# Patient Record
Sex: MALE | Race: WHITE | NOT HISPANIC OR LATINO | Employment: OTHER | URBAN - METROPOLITAN AREA
[De-identification: names, ages, dates, MRNs, and addresses within clinical notes are randomized per-mention and may not be internally consistent; named-entity substitution may affect disease eponyms.]

---

## 2017-12-14 ENCOUNTER — CONVERSION ENCOUNTER (OUTPATIENT)
Dept: SURGERY | Facility: CLINIC | Age: 52
End: 2017-12-14

## 2018-06-07 ENCOUNTER — HOSPITAL ENCOUNTER (OUTPATIENT)
Dept: CT IMAGING | Facility: HOSPITAL | Age: 53
Discharge: HOME | End: 2018-06-07
Attending: THORACIC SURGERY (CARDIOTHORACIC VASCULAR SURGERY) | Admitting: THORACIC SURGERY (CARDIOTHORACIC VASCULAR SURGERY)

## 2018-06-07 LAB — CREAT BLDA-MCNC: 0.9 MG/DL (ref 0.6–1.3)

## 2019-06-04 VITALS
HEART RATE: 77 BPM | HEIGHT: 68 IN | DIASTOLIC BLOOD PRESSURE: 101 MMHG | SYSTOLIC BLOOD PRESSURE: 156 MMHG | OXYGEN SATURATION: 96 % | BODY MASS INDEX: 37.28 KG/M2 | WEIGHT: 246 LBS

## 2022-06-13 ENCOUNTER — LAB REQUISITION (OUTPATIENT)
Dept: LAB | Facility: HOSPITAL | Age: 57
End: 2022-06-13

## 2022-06-13 DIAGNOSIS — K57.32 DIVERTICULITIS OF LARGE INTESTINE WITHOUT PERFORATION OR ABSCESS WITHOUT BLEEDING: ICD-10-CM

## 2022-06-13 DIAGNOSIS — R10.84 GENERALIZED ABDOMINAL PAIN: ICD-10-CM

## 2022-06-13 DIAGNOSIS — K57.30 DIVERTICULOSIS OF LARGE INTESTINE WITHOUT PERFORATION OR ABSCESS WITHOUT BLEEDING: ICD-10-CM

## 2022-06-13 DIAGNOSIS — K92.1 MELENA: ICD-10-CM

## 2022-06-13 PROCEDURE — 88305 TISSUE EXAM BY PATHOLOGIST: CPT | Performed by: INTERNAL MEDICINE

## 2022-06-14 LAB
LAB AP CASE REPORT: NORMAL
PATH REPORT.FINAL DX SPEC: NORMAL
PATH REPORT.GROSS SPEC: NORMAL

## 2024-11-01 ENCOUNTER — OFFICE (AMBULATORY)
Dept: URBAN - METROPOLITAN AREA CLINIC 64 | Facility: CLINIC | Age: 59
End: 2024-11-01
Payer: COMMERCIAL

## 2024-11-01 ENCOUNTER — OFFICE (AMBULATORY)
Age: 59
End: 2024-11-01

## 2024-11-01 VITALS
SYSTOLIC BLOOD PRESSURE: 139 MMHG | WEIGHT: 220 LBS | DIASTOLIC BLOOD PRESSURE: 84 MMHG | HEART RATE: 80 BPM | HEIGHT: 69 IN | HEART RATE: 80 BPM | DIASTOLIC BLOOD PRESSURE: 84 MMHG | SYSTOLIC BLOOD PRESSURE: 139 MMHG | HEIGHT: 69 IN | WEIGHT: 220 LBS

## 2024-11-01 DIAGNOSIS — K22.10 ULCER OF ESOPHAGUS WITHOUT BLEEDING: ICD-10-CM

## 2024-11-01 DIAGNOSIS — R93.3 ABNORMAL FINDINGS ON DIAGNOSTIC IMAGING OF OTHER PARTS OF DI: ICD-10-CM

## 2024-11-01 DIAGNOSIS — R49.0 DYSPHONIA: ICD-10-CM

## 2024-11-01 DIAGNOSIS — K25.9 GASTRIC ULCER, UNSPECIFIED AS ACUTE OR CHRONIC, WITHOUT HEMO: ICD-10-CM

## 2024-11-01 PROCEDURE — 99204 OFFICE O/P NEW MOD 45 MIN: CPT | Performed by: NURSE PRACTITIONER

## 2024-11-01 RX ORDER — ESOMEPRAZOLE MAGNESIUM 40 MG/1
CAPSULE, DELAYED RELEASE PELLETS ORAL
Qty: 90 | Refills: 3 | Status: ACTIVE

## 2024-11-03 ENCOUNTER — APPOINTMENT (OUTPATIENT)
Dept: CT IMAGING | Facility: HOSPITAL | Age: 59
End: 2024-11-03
Payer: COMMERCIAL

## 2024-11-03 ENCOUNTER — HOSPITAL ENCOUNTER (OUTPATIENT)
Facility: HOSPITAL | Age: 59
Setting detail: OBSERVATION
Discharge: HOME | End: 2024-11-04
Attending: EMERGENCY MEDICINE | Admitting: EMERGENCY MEDICINE
Payer: COMMERCIAL

## 2024-11-03 DIAGNOSIS — R11.0 NAUSEA WITHOUT VOMITING: ICD-10-CM

## 2024-11-03 DIAGNOSIS — R10.84 GENERALIZED ABDOMINAL PAIN: Primary | ICD-10-CM

## 2024-11-03 DIAGNOSIS — R19.7 DIARRHEA, UNSPECIFIED TYPE: ICD-10-CM

## 2024-11-03 DIAGNOSIS — A41.9 SEPSIS, DUE TO UNSPECIFIED ORGANISM, UNSPECIFIED WHETHER ACUTE ORGAN DYSFUNCTION PRESENT: ICD-10-CM

## 2024-11-03 DIAGNOSIS — K57.92 DIVERTICULITIS: ICD-10-CM

## 2024-11-03 LAB
ALBUMIN SERPL-MCNC: 4.6 G/DL (ref 3.5–5.2)
ALBUMIN/GLOB SERPL: 1.6 G/DL
ALP SERPL-CCNC: 89 U/L (ref 39–117)
ALT SERPL W P-5'-P-CCNC: 10 U/L (ref 1–41)
AMYLASE SERPL-CCNC: 64 U/L (ref 28–100)
ANION GAP SERPL CALCULATED.3IONS-SCNC: 13 MMOL/L (ref 5–15)
AST SERPL-CCNC: 21 U/L (ref 1–40)
B PARAPERT DNA SPEC QL NAA+PROBE: NOT DETECTED
B PERT DNA SPEC QL NAA+PROBE: NOT DETECTED
BASOPHILS # BLD AUTO: 0.07 10*3/MM3 (ref 0–0.2)
BASOPHILS NFR BLD AUTO: 0.5 % (ref 0–1.5)
BILIRUB SERPL-MCNC: 1.2 MG/DL (ref 0–1.2)
BILIRUB UR QL STRIP: NEGATIVE
BUN SERPL-MCNC: 9 MG/DL (ref 8–23)
BUN/CREAT SERPL: 9.8 (ref 7–25)
C PNEUM DNA NPH QL NAA+NON-PROBE: NOT DETECTED
CALCIUM SPEC-SCNC: 9.7 MG/DL (ref 8.6–10.5)
CHLORIDE SERPL-SCNC: 98 MMOL/L (ref 98–107)
CLARITY UR: CLEAR
CO2 SERPL-SCNC: 28 MMOL/L (ref 22–29)
COLOR UR: YELLOW
CREAT SERPL-MCNC: 0.92 MG/DL (ref 0.76–1.27)
D-LACTATE SERPL-SCNC: 0.8 MMOL/L (ref 0.3–2)
DEPRECATED RDW RBC AUTO: 41.1 FL (ref 37–54)
EGFRCR SERPLBLD CKD-EPI 2021: 95.2 ML/MIN/1.73
EOSINOPHIL # BLD AUTO: 0.1 10*3/MM3 (ref 0–0.4)
EOSINOPHIL NFR BLD AUTO: 0.8 % (ref 0.3–6.2)
ERYTHROCYTE [DISTWIDTH] IN BLOOD BY AUTOMATED COUNT: 12.9 % (ref 12.3–15.4)
FLUAV SUBTYP SPEC NAA+PROBE: NOT DETECTED
FLUBV RNA ISLT QL NAA+PROBE: NOT DETECTED
GLOBULIN UR ELPH-MCNC: 2.9 GM/DL
GLUCOSE SERPL-MCNC: 86 MG/DL (ref 65–99)
GLUCOSE UR STRIP-MCNC: NEGATIVE MG/DL
HADV DNA SPEC NAA+PROBE: NOT DETECTED
HCOV 229E RNA SPEC QL NAA+PROBE: NOT DETECTED
HCOV HKU1 RNA SPEC QL NAA+PROBE: NOT DETECTED
HCOV NL63 RNA SPEC QL NAA+PROBE: NOT DETECTED
HCOV OC43 RNA SPEC QL NAA+PROBE: NOT DETECTED
HCT VFR BLD AUTO: 47.2 % (ref 37.5–51)
HGB BLD-MCNC: 16.3 G/DL (ref 13–17.7)
HGB UR QL STRIP.AUTO: NEGATIVE
HMPV RNA NPH QL NAA+NON-PROBE: NOT DETECTED
HOLD SPECIMEN: NORMAL
HOLD SPECIMEN: NORMAL
HPIV1 RNA ISLT QL NAA+PROBE: NOT DETECTED
HPIV2 RNA SPEC QL NAA+PROBE: NOT DETECTED
HPIV3 RNA NPH QL NAA+PROBE: NOT DETECTED
HPIV4 P GENE NPH QL NAA+PROBE: NOT DETECTED
IMM GRANULOCYTES # BLD AUTO: 0.07 10*3/MM3 (ref 0–0.05)
IMM GRANULOCYTES NFR BLD AUTO: 0.5 % (ref 0–0.5)
KETONES UR QL STRIP: NEGATIVE
LEUKOCYTE ESTERASE UR QL STRIP.AUTO: NEGATIVE
LIPASE SERPL-CCNC: 31 U/L (ref 13–60)
LYMPHOCYTES # BLD AUTO: 1.64 10*3/MM3 (ref 0.7–3.1)
LYMPHOCYTES NFR BLD AUTO: 12.6 % (ref 19.6–45.3)
M PNEUMO IGG SER IA-ACNC: NOT DETECTED
MCH RBC QN AUTO: 30.1 PG (ref 26.6–33)
MCHC RBC AUTO-ENTMCNC: 34.5 G/DL (ref 31.5–35.7)
MCV RBC AUTO: 87.2 FL (ref 79–97)
MONOCYTES # BLD AUTO: 1.08 10*3/MM3 (ref 0.1–0.9)
MONOCYTES NFR BLD AUTO: 8.3 % (ref 5–12)
NEUTROPHILS NFR BLD AUTO: 10.02 10*3/MM3 (ref 1.7–7)
NEUTROPHILS NFR BLD AUTO: 77.3 % (ref 42.7–76)
NITRITE UR QL STRIP: NEGATIVE
NRBC BLD AUTO-RTO: 0 /100 WBC (ref 0–0.2)
PH UR STRIP.AUTO: 5.5 [PH] (ref 5–8)
PLATELET # BLD AUTO: 246 10*3/MM3 (ref 140–450)
PMV BLD AUTO: 10.9 FL (ref 6–12)
POTASSIUM SERPL-SCNC: 3.1 MMOL/L (ref 3.5–5.2)
PROCALCITONIN SERPL-MCNC: 0.08 NG/ML (ref 0–0.25)
PROT SERPL-MCNC: 7.5 G/DL (ref 6–8.5)
PROT UR QL STRIP: NEGATIVE
RBC # BLD AUTO: 5.41 10*6/MM3 (ref 4.14–5.8)
RHINOVIRUS RNA SPEC NAA+PROBE: NOT DETECTED
RSV RNA NPH QL NAA+NON-PROBE: NOT DETECTED
S PYO AG THROAT QL: NEGATIVE
SARS-COV-2 RNA NPH QL NAA+NON-PROBE: NOT DETECTED
SODIUM SERPL-SCNC: 139 MMOL/L (ref 136–145)
SP GR UR STRIP: 1.02 (ref 1–1.03)
UROBILINOGEN UR QL STRIP: NORMAL
WBC NRBC COR # BLD AUTO: 12.98 10*3/MM3 (ref 3.4–10.8)
WHOLE BLOOD HOLD COAG: NORMAL
WHOLE BLOOD HOLD SPECIMEN: NORMAL

## 2024-11-03 PROCEDURE — 0202U NFCT DS 22 TRGT SARS-COV-2: CPT

## 2024-11-03 PROCEDURE — 25010000002 LORAZEPAM PER 2 MG

## 2024-11-03 PROCEDURE — 84145 PROCALCITONIN (PCT): CPT

## 2024-11-03 PROCEDURE — G0378 HOSPITAL OBSERVATION PER HR: HCPCS

## 2024-11-03 PROCEDURE — 81003 URINALYSIS AUTO W/O SCOPE: CPT

## 2024-11-03 PROCEDURE — 96376 TX/PRO/DX INJ SAME DRUG ADON: CPT

## 2024-11-03 PROCEDURE — 80053 COMPREHEN METABOLIC PANEL: CPT

## 2024-11-03 PROCEDURE — 74177 CT ABD & PELVIS W/CONTRAST: CPT

## 2024-11-03 PROCEDURE — 83690 ASSAY OF LIPASE: CPT

## 2024-11-03 PROCEDURE — 36415 COLL VENOUS BLD VENIPUNCTURE: CPT

## 2024-11-03 PROCEDURE — 25010000002 HYDROMORPHONE 1 MG/ML SOLUTION

## 2024-11-03 PROCEDURE — 82150 ASSAY OF AMYLASE: CPT

## 2024-11-03 PROCEDURE — 96375 TX/PRO/DX INJ NEW DRUG ADDON: CPT

## 2024-11-03 PROCEDURE — 85025 COMPLETE CBC W/AUTO DIFF WBC: CPT

## 2024-11-03 PROCEDURE — 87040 BLOOD CULTURE FOR BACTERIA: CPT

## 2024-11-03 PROCEDURE — 25010000002 ONDANSETRON PER 1 MG

## 2024-11-03 PROCEDURE — 87651 STREP A DNA AMP PROBE: CPT

## 2024-11-03 PROCEDURE — 99285 EMERGENCY DEPT VISIT HI MDM: CPT

## 2024-11-03 PROCEDURE — 96374 THER/PROPH/DIAG INJ IV PUSH: CPT

## 2024-11-03 PROCEDURE — 25010000002 PROCHLORPERAZINE 10 MG/2ML SOLUTION

## 2024-11-03 PROCEDURE — 25510000001 IOPAMIDOL PER 1 ML

## 2024-11-03 PROCEDURE — 83605 ASSAY OF LACTIC ACID: CPT

## 2024-11-03 RX ORDER — POTASSIUM CHLORIDE 1500 MG/1
40 TABLET, EXTENDED RELEASE ORAL ONCE
Status: COMPLETED | OUTPATIENT
Start: 2024-11-04 | End: 2024-11-04

## 2024-11-03 RX ORDER — PROCHLORPERAZINE EDISYLATE 5 MG/ML
5 INJECTION INTRAMUSCULAR; INTRAVENOUS ONCE
Status: COMPLETED | OUTPATIENT
Start: 2024-11-03 | End: 2024-11-03

## 2024-11-03 RX ORDER — SODIUM CHLORIDE 9 MG/ML
40 INJECTION, SOLUTION INTRAVENOUS AS NEEDED
Status: DISCONTINUED | OUTPATIENT
Start: 2024-11-03 | End: 2024-11-04 | Stop reason: HOSPADM

## 2024-11-03 RX ORDER — SODIUM CHLORIDE 0.9 % (FLUSH) 0.9 %
10 SYRINGE (ML) INJECTION EVERY 12 HOURS SCHEDULED
Status: DISCONTINUED | OUTPATIENT
Start: 2024-11-04 | End: 2024-11-04 | Stop reason: HOSPADM

## 2024-11-03 RX ORDER — SODIUM CHLORIDE 0.9 % (FLUSH) 0.9 %
10 SYRINGE (ML) INJECTION AS NEEDED
Status: DISCONTINUED | OUTPATIENT
Start: 2024-11-03 | End: 2024-11-04 | Stop reason: HOSPADM

## 2024-11-03 RX ORDER — BISACODYL 10 MG
10 SUPPOSITORY, RECTAL RECTAL DAILY PRN
Status: DISCONTINUED | OUTPATIENT
Start: 2024-11-03 | End: 2024-11-04 | Stop reason: HOSPADM

## 2024-11-03 RX ORDER — POTASSIUM CHLORIDE 1500 MG/1
40 TABLET, EXTENDED RELEASE ORAL ONCE
Status: COMPLETED | OUTPATIENT
Start: 2024-11-03 | End: 2024-11-03

## 2024-11-03 RX ORDER — LORAZEPAM 2 MG/ML
0.5 INJECTION INTRAMUSCULAR ONCE
Status: COMPLETED | OUTPATIENT
Start: 2024-11-03 | End: 2024-11-03

## 2024-11-03 RX ORDER — POLYETHYLENE GLYCOL 3350 17 G/17G
17 POWDER, FOR SOLUTION ORAL DAILY PRN
Status: DISCONTINUED | OUTPATIENT
Start: 2024-11-03 | End: 2024-11-04 | Stop reason: HOSPADM

## 2024-11-03 RX ORDER — IOPAMIDOL 755 MG/ML
100 INJECTION, SOLUTION INTRAVASCULAR
Status: COMPLETED | OUTPATIENT
Start: 2024-11-03 | End: 2024-11-03

## 2024-11-03 RX ORDER — BISACODYL 5 MG/1
5 TABLET, DELAYED RELEASE ORAL DAILY PRN
Status: DISCONTINUED | OUTPATIENT
Start: 2024-11-03 | End: 2024-11-04 | Stop reason: HOSPADM

## 2024-11-03 RX ORDER — ACETAMINOPHEN 650 MG/1
650 SUPPOSITORY RECTAL ONCE
Status: COMPLETED | OUTPATIENT
Start: 2024-11-03 | End: 2024-11-03

## 2024-11-03 RX ORDER — ESOMEPRAZOLE MAGNESIUM 40 MG/1
40 CAPSULE, DELAYED RELEASE ORAL DAILY
COMMUNITY
Start: 2024-05-21

## 2024-11-03 RX ORDER — HYDROMORPHONE HYDROCHLORIDE 1 MG/ML
0.5 INJECTION, SOLUTION INTRAMUSCULAR; INTRAVENOUS; SUBCUTANEOUS EVERY 4 HOURS PRN
Status: COMPLETED | OUTPATIENT
Start: 2024-11-03 | End: 2024-11-04

## 2024-11-03 RX ORDER — IRBESARTAN AND HYDROCHLOROTHIAZIDE 150; 12.5 MG/1; MG/1
1 TABLET, FILM COATED ORAL DAILY
COMMUNITY

## 2024-11-03 RX ORDER — AMOXICILLIN 250 MG
2 CAPSULE ORAL 2 TIMES DAILY PRN
Status: DISCONTINUED | OUTPATIENT
Start: 2024-11-03 | End: 2024-11-04 | Stop reason: HOSPADM

## 2024-11-03 RX ORDER — ONDANSETRON 2 MG/ML
4 INJECTION INTRAMUSCULAR; INTRAVENOUS ONCE
Status: COMPLETED | OUTPATIENT
Start: 2024-11-03 | End: 2024-11-03

## 2024-11-03 RX ORDER — ONDANSETRON 2 MG/ML
4 INJECTION INTRAMUSCULAR; INTRAVENOUS EVERY 6 HOURS PRN
Status: DISCONTINUED | OUTPATIENT
Start: 2024-11-03 | End: 2024-11-04 | Stop reason: HOSPADM

## 2024-11-03 RX ADMIN — ACETAMINOPHEN 650 MG: 650 SUPPOSITORY RECTAL at 17:45

## 2024-11-03 RX ADMIN — POTASSIUM CHLORIDE 40 MEQ: 1500 TABLET, EXTENDED RELEASE ORAL at 22:20

## 2024-11-03 RX ADMIN — Medication 10 ML: at 23:07

## 2024-11-03 RX ADMIN — AMOXICILLIN AND CLAVULANATE POTASSIUM 1 TABLET: 875; 125 TABLET, FILM COATED ORAL at 22:20

## 2024-11-03 RX ADMIN — IOPAMIDOL 100 ML: 755 INJECTION, SOLUTION INTRAVENOUS at 19:11

## 2024-11-03 RX ADMIN — HYDROMORPHONE HYDROCHLORIDE 0.5 MG: 1 INJECTION, SOLUTION INTRAMUSCULAR; INTRAVENOUS; SUBCUTANEOUS at 17:44

## 2024-11-03 RX ADMIN — ONDANSETRON 4 MG: 2 INJECTION, SOLUTION INTRAMUSCULAR; INTRAVENOUS at 17:44

## 2024-11-03 RX ADMIN — HYDROMORPHONE HYDROCHLORIDE 0.5 MG: 1 INJECTION, SOLUTION INTRAMUSCULAR; INTRAVENOUS; SUBCUTANEOUS at 22:20

## 2024-11-03 RX ADMIN — PROCHLORPERAZINE EDISYLATE 5 MG: 5 INJECTION INTRAMUSCULAR; INTRAVENOUS at 18:02

## 2024-11-03 RX ADMIN — LORAZEPAM 0.5 MG: 2 INJECTION INTRAMUSCULAR; INTRAVENOUS at 19:00

## 2024-11-03 NOTE — ED PROVIDER NOTES
Subjective   History of Present Illness  59-year-old male with history of hiatal hernia and gastric ulcer presents the ED with complaints of generalized abdominal pain worsening in the left lower quadrant along with nausea without vomiting and diarrhea that has been ongoing for approximately a week and worse over the last 2 days.  Reports approximately 6-8 episodes of diarrhea daily.  States he developed a fever today.  Also reports a mildly sore throat however this has been ongoing and not new.  Patient states he has a small macular rash to the cheeks and top of the head whenever his potassium is low and this is started develop over the last few days.  Denies chest pain, shortness of breath, congestion, cough, recent travel or sick contacts, dizziness, vision changes, melena, hematochezia, UTI symptoms, difficulty swallowing or difficulty handling secretions.  Reports he had an EGD completed on 4/29/2024 at [facility] and this showed a nonbleeding gastric ulcer and hiatal hernia.  He does not have a follow-up until 12/26/2024 with GI    PCP: currently trying to find a new PCP  GI: [facility]        Review of Systems   Constitutional:  Positive for fever.   HENT:  Negative for congestion.    Respiratory:  Negative for cough and shortness of breath.    Cardiovascular:  Negative for chest pain.   Gastrointestinal:  Positive for abdominal pain, diarrhea and nausea. Negative for blood in stool and vomiting.   Genitourinary:  Negative for dysuria and frequency.       Past Medical History:   Diagnosis Date    Hypertension        Allergies   Allergen Reactions    Clindamycin/Lincomycin GI Intolerance       Past Surgical History:   Procedure Laterality Date    APPENDECTOMY         History reviewed. No pertinent family history.    Social History     Socioeconomic History    Marital status: Single   Tobacco Use    Smoking status: Never    Smokeless tobacco: Never   Vaping Use    Vaping status: Never Used   Substance and Sexual  "Activity    Alcohol use: Yes     Comment: Socially    Drug use: Never    Sexual activity: Defer           Objective   Physical Exam  Vitals reviewed.   HENT:      Head: Normocephalic.      Comments: Very mild macular rash noted to the cheeks and top of head.  Patient denies itching or burning sensation  Eyes:      Extraocular Movements: Extraocular movements intact.      Conjunctiva/sclera: Conjunctivae normal.   Cardiovascular:      Rate and Rhythm: Normal rate and regular rhythm.      Pulses: Normal pulses.      Heart sounds: Normal heart sounds.   Pulmonary:      Effort: Pulmonary effort is normal.      Breath sounds: Normal breath sounds.   Abdominal:      General: Bowel sounds are normal.      Palpations: Abdomen is soft.      Tenderness: There is abdominal tenderness.      Comments: Generalized tenderness to the abdomen on palpation.  Worsening to the left lower quadrant.  No peritonitis rigidity or guarding   Musculoskeletal:         General: Normal range of motion.   Skin:     General: Skin is warm and dry.      Findings: Rash present.   Neurological:      General: No focal deficit present.      Mental Status: He is alert and oriented to person, place, and time.   Psychiatric:         Mood and Affect: Mood normal.         Behavior: Behavior normal.         Procedures           ED Course  ED Course as of 11/03/24 2313   Sun Nov 03, 2024   6981 Inspect reviewed of patient, patient had clonazepam 1mg tabs filled 1/22/24 at a quant of 90 for 30 days. Patient states he has not taken this in several weeks but is requesting something for anxiety prior to CT. 0.5 mg ativan ordered to be given prior to CT [KB]   1744 Requested rectal temp [KB]   1831 Temp: 100 °F (37.8 °C) [KB]   1849 Marked ready for CT [KB]      ED Course User Index  [KB] Glenda Caruso APRN      /84 (BP Location: Left arm, Patient Position: Lying)   Pulse 80   Temp 98.6 °F (37 °C) (Oral)   Resp 18   Ht 172.7 cm (67.99\")   Wt 100 kg " "(220 lb 7.4 oz)   SpO2 97%   BMI 33.53 kg/m²   Labs Reviewed   COMPREHENSIVE METABOLIC PANEL - Abnormal; Notable for the following components:       Result Value    Potassium 3.1 (*)     All other components within normal limits    Narrative:     GFR Normal >60  Chronic Kidney Disease <60  Kidney Failure <15     CBC WITH AUTO DIFFERENTIAL - Abnormal; Notable for the following components:    WBC 12.98 (*)     Neutrophil % 77.3 (*)     Lymphocyte % 12.6 (*)     Neutrophils, Absolute 10.02 (*)     Monocytes, Absolute 1.08 (*)     Immature Grans, Absolute 0.07 (*)     All other components within normal limits   RESPIRATORY PANEL PCR W/ COVID-19 (SARS-COV-2), NP SWAB IN UTM/VTP, 2 HR TAT - Normal    Narrative:     In the setting of a positive respiratory panel with a viral infection PLUS a negative procalcitonin without other underlying concern for bacterial infection, consider observing off antibiotics or discontinuation of antibiotics and continue supportive care. If the respiratory panel is positive for atypical bacterial infection (Bordetella pertussis, Chlamydophila pneumoniae, or Mycoplasma pneumoniae), consider antibiotic de-escalation to target atypical bacterial infection.   RAPID STREP A SCREEN - Normal   LIPASE - Normal   AMYLASE - Normal   PROCALCITONIN - Normal    Narrative:     As a Marker for Sepsis (Non-Neonates):    1. <0.5 ng/mL represents a low risk of severe sepsis and/or septic shock.  2. >2 ng/mL represents a high risk of severe sepsis and/or septic shock.    As a Marker for Lower Respiratory Tract Infections that require antibiotic therapy:    PCT on Admission    Antibiotic Therapy       6-12 Hrs later    >0.5                Strongly Recommended  >0.25 - <0.5        Recommended   0.1 - 0.25          Discouraged              Remeasure/reassess PCT  <0.1                Strongly Discouraged     Remeasure/reassess PCT    As 28 day mortality risk marker: \"Change in Procalcitonin Result\" (>80% or " <=80%) if Day 0 (or Day 1) and Day 4 values are available. Refer to [email or web address]    Change in PCT <=80%  A decrease of PCT levels below or equal to 80% defines a positive change in PCT test result representing a higher risk for 28-day all-cause mortality of patients diagnosed with severe sepsis for septic shock.    Change in PCT >80%  A decrease of PCT levels of more than 80% defines a negative change in PCT result representing a lower risk for 28-day all-cause mortality of patients diagnosed with severe sepsis or septic shock.      URINALYSIS W/ CULTURE IF INDICATED - Normal    Narrative:     In absence of clinical symptoms, the presence of pyuria, bacteria, and/or nitrites on the urinalysis result does not correlate with infection.  Urine microscopic not indicated.   POC LACTATE - Normal   BLOOD CULTURE   BLOOD CULTURE   CLOSTRIDIOIDES DIFFICILE TOXIN    Narrative:     The following orders were created for panel order Clostridioides difficile Toxin - Stool, Per Rectum.  Procedure                               Abnormality         Status                     ---------                               -----------         ------                     Clostridioides difficile...[[id number]]                                                   Please view results for these tests on the individual orders.   GASTROINTESTINAL PANEL, PCR (PREFERRED) DOES NOT INCLUDE CDIFF   CLOSTRIDIOIDES DIFFICILE EIA   RAINBOW DRAW    Narrative:     The following orders were created for panel order [place] Draw.  Procedure                               Abnormality         Status                     ---------                               -----------         ------                     Green Top (Gel)[[id number]]                                  Final result               Lavender Top[[id number]]                                     Final result               Gold Top - SST[[id number]]                                   Final  result               Light Blue Top[[id number]]                                   Final result                 Please view results for these tests on the individual orders.   BASIC METABOLIC PANEL   CBC WITH AUTO DIFFERENTIAL   CBC AND DIFFERENTIAL    Narrative:     The following orders were created for panel order CBC & Differential.  Procedure                               Abnormality         Status                     ---------                               -----------         ------                     CBC Auto Differential[[id number]]        Abnormal            Final result                 Please view results for these tests on the individual orders.   GREEN TOP   LAVENDER TOP   GOLD TOP - SST   LIGHT BLUE TOP     Medications   sodium chloride 0.9 % flush 10 mL (has no administration in time range)   potassium chloride (KLOR-CON M20) CR tablet 40 mEq (has no administration in time range)   sodium chloride 0.9 % flush 10 mL (10 mL Intravenous Given 11/3/24 2307)   sodium chloride 0.9 % flush 10 mL (has no administration in time range)   sodium chloride 0.9 % infusion 40 mL (has no administration in time range)   ondansetron (ZOFRAN) injection 4 mg (has no administration in time range)   melatonin tablet 5 mg (has no administration in time range)   sennosides-docusate (PERICOLACE) 8.6-50 MG per tablet 2 tablet (has no administration in time range)     And   polyethylene glycol (MIRALAX) packet 17 g (has no administration in time range)     And   bisacodyl (DULCOLAX) EC tablet 5 mg (has no administration in time range)     And   bisacodyl (DULCOLAX) suppository 10 mg (has no administration in time range)   HYDROmorphone (DILAUDID) injection 0.5 mg (has no administration in time range)   sodium chloride 0.9 % bolus 500 mL (has no administration in time range)   ondansetron (ZOFRAN) injection 4 mg (4 mg Intravenous Given 11/3/24 1744)   HYDROmorphone (DILAUDID) injection 0.5 mg (0.5 mg Intravenous Given 11/3/24 1744)    acetaminophen (TYLENOL) suppository 650 mg (650 mg Rectal Given 11/3/24 1745)   LORazepam (ATIVAN) injection 0.5 mg (0.5 mg Intravenous Given 11/3/24 1900)   prochlorperazine (COMPAZINE) injection 5 mg (5 mg Intravenous Given 11/3/24 1802)   iopamidol (ISOVUE-370) 76 % injection 100 mL (100 mL Intravenous Given 11/3/24 1911)   amoxicillin-clavulanate (AUGMENTIN) 875-125 MG per tablet 1 tablet (1 tablet Oral Given 11/3/24 2220)   potassium chloride (KLOR-CON M20) CR tablet 40 mEq (40 mEq Oral Given 11/3/24 2220)   HYDROmorphone (DILAUDID) injection 0.5 mg (0.5 mg Intravenous Given 11/3/24 2220)   CT Abdomen Pelvis With Contrast    Result Date: 11/3/2024  Impression: Findings compatible with uncomplicated sigmoid diverticulitis. Follow-up to complete resolution recommended Electronically Signed: Cam Dias MD  11/3/2024 7:38 PM EST  Workstation ID: [id number]                                             Medical Decision Making  Patient seen for above complaints.  IV was established and blood work was obtained to assess for electrolyte normalities, infection, lipase.  Procalcitonin 0.08, amylase 64, lipase 31, white blood cell count mildly elevated at 12.98, hemoglobin 16.3, electrolytes fairly within normal limits besides potassium 3.1, lactic 0.8, blood cultures currently pending at this time.  Strep negative, respiratory panel negative.  UA obtained, this was negative.  Attempted to obtain C. difficile and GI PCR panel however was not able to obtain during ER course.  CT of the abdomen and pelvis was obtained and independently interpreted by the radiologist as findings compatible with uncomplicated sigmoid diverticulitis, follow-up to complete resolution recommended.  Due to the diverticulitis, patient was given Augmentin during ER course.  Was also given Dilaudid Zofran Compazine and Ativan.  Was given Ativan due to anxiety during CT.  On follow-up, patient states he feels moderately better at this time.  Was  given 40mEq hypokalemia.  Patient replaced observation unit for further pain management and follow-up in the morning along with GI consult.  Discussed plan of care with patient and girlfriend at bedside who verbalized understanding were agreeable plan of care at this time.    Problems Addressed:  Diarrhea, unspecified type: acute illness or injury  Diverticulitis: acute illness or injury  Generalized abdominal pain: acute illness or injury  Nausea without vomiting: acute illness or injury  Sepsis, due to unspecified organism, unspecified whether acute organ dysfunction present: acute illness or injury    Amount and/or Complexity of Data Reviewed  Labs: ordered. Decision-making details documented in ED Course.  Radiology: ordered and independent interpretation performed. Decision-making details documented in ED Course.  ECG/medicine tests: ordered.    Risk  OTC drugs.  Prescription drug management.  Decision regarding hospitalization.        Final diagnoses:   Generalized abdominal pain   Nausea without vomiting   Diarrhea, unspecified type   Diverticulitis   Sepsis, due to unspecified organism, unspecified whether acute organ dysfunction present       ED Disposition  ED Disposition       ED Disposition   Decision to Admit    Condition   --    Comment   --               No follow-up provider specified.       Medication List      No changes were made to your prescriptions during this visit.            Glenda Caruso, APRN  11/03/24 9747

## 2024-11-04 ENCOUNTER — ON CAMPUS - OUTPATIENT (AMBULATORY)
Age: 59
End: 2024-11-04

## 2024-11-04 ENCOUNTER — ON CAMPUS - OUTPATIENT (AMBULATORY)
Dept: URBAN - METROPOLITAN AREA HOSPITAL 85 | Facility: HOSPITAL | Age: 59
End: 2024-11-04

## 2024-11-04 ENCOUNTER — TELEPHONE (OUTPATIENT)
Dept: CALL CENTER | Facility: HOSPITAL | Age: 59
End: 2024-11-04
Payer: COMMERCIAL

## 2024-11-04 VITALS
RESPIRATION RATE: 20 BRPM | OXYGEN SATURATION: 97 % | DIASTOLIC BLOOD PRESSURE: 72 MMHG | HEIGHT: 68 IN | BODY MASS INDEX: 33.41 KG/M2 | SYSTOLIC BLOOD PRESSURE: 118 MMHG | TEMPERATURE: 98.3 F | HEART RATE: 97 BPM | WEIGHT: 220.46 LBS

## 2024-11-04 DIAGNOSIS — R19.7 DIARRHEA, UNSPECIFIED: ICD-10-CM

## 2024-11-04 DIAGNOSIS — D72.829 ELEVATED WHITE BLOOD CELL COUNT, UNSPECIFIED: ICD-10-CM

## 2024-11-04 DIAGNOSIS — K57.32 DIVERTICULITIS OF LARGE INTESTINE WITHOUT PERFORATION OR ABS: ICD-10-CM

## 2024-11-04 DIAGNOSIS — R93.3 ABNORMAL FINDINGS ON DIAGNOSTIC IMAGING OF OTHER PARTS OF DI: ICD-10-CM

## 2024-11-04 DIAGNOSIS — K44.9 DIAPHRAGMATIC HERNIA WITHOUT OBSTRUCTION OR GANGRENE: ICD-10-CM

## 2024-11-04 DIAGNOSIS — R11.0 NAUSEA: ICD-10-CM

## 2024-11-04 DIAGNOSIS — R10.30 LOWER ABDOMINAL PAIN, UNSPECIFIED: ICD-10-CM

## 2024-11-04 LAB
ADV 40+41 DNA STL QL NAA+NON-PROBE: NOT DETECTED
ANION GAP SERPL CALCULATED.3IONS-SCNC: 10.6 MMOL/L (ref 5–15)
ASTRO TYP 1-8 RNA STL QL NAA+NON-PROBE: NOT DETECTED
BASOPHILS # BLD AUTO: 0.05 10*3/MM3 (ref 0–0.2)
BASOPHILS NFR BLD AUTO: 0.4 % (ref 0–1.5)
BUN SERPL-MCNC: 7 MG/DL (ref 8–23)
BUN/CREAT SERPL: 8.1 (ref 7–25)
C CAYETANENSIS DNA STL QL NAA+NON-PROBE: NOT DETECTED
C COLI+JEJ+UPSA DNA STL QL NAA+NON-PROBE: DETECTED
C DIFF GDH + TOXINS A+B STL QL IA.RAPID: NEGATIVE
C DIFF GDH + TOXINS A+B STL QL IA.RAPID: NEGATIVE
CALCIUM SPEC-SCNC: 9.4 MG/DL (ref 8.6–10.5)
CHLORIDE SERPL-SCNC: 98 MMOL/L (ref 98–107)
CO2 SERPL-SCNC: 29.4 MMOL/L (ref 22–29)
CREAT SERPL-MCNC: 0.86 MG/DL (ref 0.76–1.27)
CRYPTOSP DNA STL QL NAA+NON-PROBE: NOT DETECTED
DEPRECATED RDW RBC AUTO: 41.6 FL (ref 37–54)
E HISTOLYT DNA STL QL NAA+NON-PROBE: NOT DETECTED
EAEC PAA PLAS AGGR+AATA ST NAA+NON-PRB: NOT DETECTED
EC STX1+STX2 GENES STL QL NAA+NON-PROBE: NOT DETECTED
EGFRCR SERPLBLD CKD-EPI 2021: 99.1 ML/MIN/1.73
EOSINOPHIL # BLD AUTO: 0.09 10*3/MM3 (ref 0–0.4)
EOSINOPHIL NFR BLD AUTO: 0.8 % (ref 0.3–6.2)
EPEC EAE GENE STL QL NAA+NON-PROBE: NOT DETECTED
ERYTHROCYTE [DISTWIDTH] IN BLOOD BY AUTOMATED COUNT: 13.2 % (ref 12.3–15.4)
ETEC LTA+ST1A+ST1B TOX ST NAA+NON-PROBE: NOT DETECTED
G LAMBLIA DNA STL QL NAA+NON-PROBE: NOT DETECTED
GLUCOSE SERPL-MCNC: 89 MG/DL (ref 65–99)
HCT VFR BLD AUTO: 46.9 % (ref 37.5–51)
HGB BLD-MCNC: 15.8 G/DL (ref 13–17.7)
IMM GRANULOCYTES # BLD AUTO: 0.05 10*3/MM3 (ref 0–0.05)
IMM GRANULOCYTES NFR BLD AUTO: 0.4 % (ref 0–0.5)
LYMPHOCYTES # BLD AUTO: 1.64 10*3/MM3 (ref 0.7–3.1)
LYMPHOCYTES NFR BLD AUTO: 14.7 % (ref 19.6–45.3)
MAGNESIUM SERPL-MCNC: 1.6 MG/DL (ref 1.6–2.4)
MCH RBC QN AUTO: 29.3 PG (ref 26.6–33)
MCHC RBC AUTO-ENTMCNC: 33.7 G/DL (ref 31.5–35.7)
MCV RBC AUTO: 87 FL (ref 79–97)
MONOCYTES # BLD AUTO: 1.01 10*3/MM3 (ref 0.1–0.9)
MONOCYTES NFR BLD AUTO: 9.1 % (ref 5–12)
NEUTROPHILS NFR BLD AUTO: 74.6 % (ref 42.7–76)
NEUTROPHILS NFR BLD AUTO: 8.31 10*3/MM3 (ref 1.7–7)
NOROVIRUS GI+II RNA STL QL NAA+NON-PROBE: NOT DETECTED
NRBC BLD AUTO-RTO: 0 /100 WBC (ref 0–0.2)
P SHIGELLOIDES DNA STL QL NAA+NON-PROBE: NOT DETECTED
PLATELET # BLD AUTO: 222 10*3/MM3 (ref 140–450)
PMV BLD AUTO: 10.9 FL (ref 6–12)
POTASSIUM SERPL-SCNC: 3.4 MMOL/L (ref 3.5–5.2)
RBC # BLD AUTO: 5.39 10*6/MM3 (ref 4.14–5.8)
RVA RNA STL QL NAA+NON-PROBE: NOT DETECTED
S ENT+BONG DNA STL QL NAA+NON-PROBE: NOT DETECTED
SAPO I+II+IV+V RNA STL QL NAA+NON-PROBE: NOT DETECTED
SHIGELLA SP+EIEC IPAH ST NAA+NON-PROBE: NOT DETECTED
SODIUM SERPL-SCNC: 138 MMOL/L (ref 136–145)
V CHOL+PARA+VUL DNA STL QL NAA+NON-PROBE: NOT DETECTED
V CHOLERAE DNA STL QL NAA+NON-PROBE: NOT DETECTED
WBC NRBC COR # BLD AUTO: 11.15 10*3/MM3 (ref 3.4–10.8)
Y ENTEROCOL DNA STL QL NAA+NON-PROBE: NOT DETECTED

## 2024-11-04 PROCEDURE — 80048 BASIC METABOLIC PNL TOTAL CA: CPT

## 2024-11-04 PROCEDURE — G0378 HOSPITAL OBSERVATION PER HR: HCPCS

## 2024-11-04 PROCEDURE — 25810000003 SODIUM CHLORIDE 0.9 % SOLUTION

## 2024-11-04 PROCEDURE — 99223 1ST HOSP IP/OBS HIGH 75: CPT | Mod: SA | Performed by: NURSE PRACTITIONER

## 2024-11-04 PROCEDURE — 87507 IADNA-DNA/RNA PROBE TQ 12-25: CPT

## 2024-11-04 PROCEDURE — 25010000002 KETOROLAC TROMETHAMINE PER 15 MG: Performed by: NURSE PRACTITIONER

## 2024-11-04 PROCEDURE — 87324 CLOSTRIDIUM AG IA: CPT

## 2024-11-04 PROCEDURE — 85025 COMPLETE CBC W/AUTO DIFF WBC: CPT

## 2024-11-04 PROCEDURE — 83735 ASSAY OF MAGNESIUM: CPT | Performed by: NURSE PRACTITIONER

## 2024-11-04 PROCEDURE — 25010000002 HYDROMORPHONE PER 4 MG

## 2024-11-04 PROCEDURE — 87449 NOS EACH ORGANISM AG IA: CPT

## 2024-11-04 PROCEDURE — 96376 TX/PRO/DX INJ SAME DRUG ADON: CPT

## 2024-11-04 PROCEDURE — 96375 TX/PRO/DX INJ NEW DRUG ADDON: CPT

## 2024-11-04 RX ORDER — LOSARTAN POTASSIUM 50 MG/1
50 TABLET ORAL
Status: DISCONTINUED | OUTPATIENT
Start: 2024-11-04 | End: 2024-11-04 | Stop reason: HOSPADM

## 2024-11-04 RX ORDER — ONDANSETRON 4 MG/1
4 TABLET, ORALLY DISINTEGRATING ORAL EVERY 8 HOURS PRN
Qty: 10 TABLET | Refills: 0 | Status: SHIPPED | OUTPATIENT
Start: 2024-11-04

## 2024-11-04 RX ORDER — PANTOPRAZOLE SODIUM 40 MG/1
40 TABLET, DELAYED RELEASE ORAL
Status: DISCONTINUED | OUTPATIENT
Start: 2024-11-04 | End: 2024-11-04 | Stop reason: HOSPADM

## 2024-11-04 RX ORDER — ACETAMINOPHEN 325 MG/1
650 TABLET ORAL EVERY 6 HOURS PRN
Status: DISCONTINUED | OUTPATIENT
Start: 2024-11-04 | End: 2024-11-04 | Stop reason: HOSPADM

## 2024-11-04 RX ORDER — HYDROCHLOROTHIAZIDE 12.5 MG/1
12.5 TABLET ORAL
Status: DISCONTINUED | OUTPATIENT
Start: 2024-11-04 | End: 2024-11-04 | Stop reason: HOSPADM

## 2024-11-04 RX ORDER — HYDROCODONE BITARTRATE AND ACETAMINOPHEN 5; 325 MG/1; MG/1
1 TABLET ORAL EVERY 6 HOURS PRN
Status: DISCONTINUED | OUTPATIENT
Start: 2024-11-04 | End: 2024-11-04 | Stop reason: HOSPADM

## 2024-11-04 RX ORDER — AZITHROMYCIN 250 MG/1
1000 TABLET, FILM COATED ORAL ONCE
Status: COMPLETED | OUTPATIENT
Start: 2024-11-04 | End: 2024-11-04

## 2024-11-04 RX ORDER — KETOROLAC TROMETHAMINE 30 MG/ML
15 INJECTION, SOLUTION INTRAMUSCULAR; INTRAVENOUS ONCE
Status: COMPLETED | OUTPATIENT
Start: 2024-11-04 | End: 2024-11-04

## 2024-11-04 RX ADMIN — AZITHROMYCIN DIHYDRATE 1000 MG: 250 TABLET, FILM COATED ORAL at 11:13

## 2024-11-04 RX ADMIN — PANTOPRAZOLE SODIUM 40 MG: 40 TABLET, DELAYED RELEASE ORAL at 08:40

## 2024-11-04 RX ADMIN — HYDROMORPHONE HYDROCHLORIDE 0.5 MG: 1 INJECTION, SOLUTION INTRAMUSCULAR; INTRAVENOUS; SUBCUTANEOUS at 06:02

## 2024-11-04 RX ADMIN — Medication 10 ML: at 08:40

## 2024-11-04 RX ADMIN — LOSARTAN POTASSIUM 50 MG: 50 TABLET, FILM COATED ORAL at 08:40

## 2024-11-04 RX ADMIN — AMOXICILLIN AND CLAVULANATE POTASSIUM 1 TABLET: 875; 125 TABLET, FILM COATED ORAL at 08:40

## 2024-11-04 RX ADMIN — HYDROCHLOROTHIAZIDE 12.5 MG: 12.5 TABLET ORAL at 08:40

## 2024-11-04 RX ADMIN — KETOROLAC TROMETHAMINE 15 MG: 30 INJECTION, SOLUTION INTRAMUSCULAR at 12:37

## 2024-11-04 RX ADMIN — POTASSIUM CHLORIDE 40 MEQ: 1500 TABLET, EXTENDED RELEASE ORAL at 01:21

## 2024-11-04 RX ADMIN — SODIUM CHLORIDE 500 ML: 9 INJECTION, SOLUTION INTRAVENOUS at 00:38

## 2024-11-04 RX ADMIN — HYDROMORPHONE HYDROCHLORIDE 0.5 MG: 1 INJECTION, SOLUTION INTRAMUSCULAR; INTRAVENOUS; SUBCUTANEOUS at 01:21

## 2024-11-04 NOTE — TELEPHONE ENCOUNTER
Patient called and stated just dc/ from ER and was told he had a bacteria, nothing in discharge information tells what kind he had and wants to know.

## 2024-11-04 NOTE — PLAN OF CARE
Goal Outcome Evaluation:  Plan of Care Reviewed With: patient        Progress: no change  Outcome Evaluation: Patient NPO for GI consult. Potassium 3.1 in ED and given 2 doses of oral potassium along with 500 mL bolus of NS. Having C/O ABD pain. Medication administration effective. Put on 2L O2 r/t SPO2 desat after admiinistration of pain medication.

## 2024-11-04 NOTE — PAYOR COMM NOTE
"This is discharge notification for Danny Peña  Reference/Auth # [id number]   Pt discharged on 11/4/24    Pending observation authorization request    Paty Aparicio RN, BSN  Utilization Review Nurse  [facility]  Direct & confidential phone # [phone]  Fax # [phone]      Danny Peña (59 y.o. Male)       Date of Birth   1965    Social Security Number       Address   [address] [place] [address]    Home Phone   [phone]    MRN   [id number]       Islam   None    Marital Status   Single                            Admission Date   11/3/24    Admission Type   Emergency    Admitting Provider   Isrrael Coleman MD    Attending Provider       Department, Room/Bed   [facility] OBSERVATION, 223/1       Discharge Date   11/4/2024    Discharge Disposition   Home or Self Care    Discharge Destination   Home                              Attending Provider: (none)   Allergies: Clindamycin/lincomycin    Isolation: None   Infection: Campylobacter (11/04/24)   Code Status: CPR    Ht: 172.7 cm (67.99\")   Wt: 100 kg (220 lb 7.4 oz)    Admission Cmt: None   Principal Problem: Diverticulitis [K57.92]                   Active Insurance as of 11/3/2024       Primary Coverage       Payor Plan Insurance Group Employer/Plan Group    [facility] [id number]       Payor Plan Address Payor Plan Phone Number Payor Plan Fax Number Effective Dates    [address]   1/1/2021 - None Entered    [facility] [address]         Subscriber Name Subscriber Birth Date Member ID       DANNY PEÑA 1965 [id number]                     Emergency Contacts        (Rel.) Home Phone Work Phone Mobile Phone    LUIS MANUEL PARKER (Significant Other) -- -- [phone]                 Discharge Summary        Anna Winslow APRN at 11/04/24 1353          [facility]    Mervat Pike APRN    CHIEF COMPLAINT:     Abdominal " "pain     HISTORY OF PRESENT ILLNESS:    Abdominal Pain  Associated symptoms include diarrhea, a fever and nausea. Pertinent negatives include no vomiting.       History of Present Illness  ED 11/03/2024  59-year-old male with history of hiatal hernia and gastric ulcer presents the ED with complaints of generalized abdominal pain worsening in the left lower quadrant along with nausea without vomiting and diarrhea that has been ongoing for approximately a week and worse over the last 2 days.  Reports approximately 6-8 episodes of diarrhea daily.  States he developed a fever today.  Also reports a mildly sore throat however this has been ongoing and not new.  Patient states he has a small macular rash to the cheeks and top of the head whenever his potassium is low and this is started develop over the last few days.  Denies chest pain, shortness of breath, congestion, cough, recent travel or sick contacts, dizziness, vision changes, melena, hematochezia, UTI symptoms, difficulty swallowing or difficulty handling secretions.  Reports he had an EGD completed on 4/29/2024 at [facility] and this showed a nonbleeding gastric ulcer and hiatal hernia.  He does not have a follow-up until 12/26/2024 with GI     Observation 11/04/2024  Patient agrees with HPI noted above including bilateral lower abdominal pain, left worse than right since Friday.  He was prescribed amox-clav but has not yet started.  He currently rates pain 7/10.  He states that pain is worse with exertion and associated with diarrhea.  He estimates about 8-10 bowel movements in the last 24 hours.  Denies any GI bleed.     Reassessed this afternoon and discussed test results. He states he doesn't feel \"back to normal\" but would like to be discharged home. He states dilaudid given in ED and toradol in obs did not help his pain. He declines any po pain meds. He was able to tolerate gi soft diet well although reports decreased appetite. No vomiting.      Past Medical " History:   Diagnosis Date    Hypertension      Past Surgical History:   Procedure Laterality Date    APPENDECTOMY       History reviewed. No pertinent family history.  Social History     Tobacco Use    Smoking status: Never    Smokeless tobacco: Never   Vaping Use    Vaping status: Never Used   Substance Use Topics    Alcohol use: Yes     Comment: Socially    Drug use: Never     Medications Prior to Admission   Medication Sig Dispense Refill Last Dose/Taking    esomeprazole (nexIUM) 40 MG capsule Take 1 capsule by mouth Daily.   11/3/2024 at  7:00 AM    irbesartan-hydrochlorothiazide (AVALIDE) 150-12.5 MG tablet Take 1 tablet by mouth Daily.   11/3/2024 at  8:00 AM    [DISCONTINUED] amoxicillin-clavulanate (AUGMENTIN) 875-125 MG per tablet Take 1 tablet by mouth Every 12 (Twelve) Hours.   Taking     Allergies:  Clindamycin/lincomycin    Immunization History   Administered Date(s) Administered    COVID-19 (PFIZER) Purple Cap Monovalent 03/09/2021, 03/30/2021, 12/08/2021           REVIEW OF SYSTEMS:    Review of Systems   Constitutional: Positive for fever.   Gastrointestinal:  Positive for abdominal pain, diarrhea and nausea. Negative for vomiting.   All other systems reviewed and are negative.        Vital Signs  Temp:  [98.1 °F (36.7 °C)-101.4 °F (38.6 °C)] 98.3 °F (36.8 °C)  Heart Rate:  [] 88  Resp:  [17-22] 20  BP: (100-146)/(66-89) 118/72          Physical Exam:  Physical Exam  Vitals and nursing note reviewed.   Constitutional:       Appearance: Normal appearance.   HENT:      Head: Normocephalic and atraumatic.      Right Ear: External ear normal.      Left Ear: External ear normal.      Nose: Nose normal.      Mouth/Throat:      Mouth: Mucous membranes are moist.   Eyes:      Extraocular Movements: Extraocular movements intact.   Cardiovascular:      Rate and Rhythm: Normal rate and regular rhythm.      Pulses: Normal pulses.      Heart sounds: Normal heart sounds.   Pulmonary:      Effort: Pulmonary  effort is normal.      Breath sounds: Normal breath sounds.   Abdominal:      General: Abdomen is flat. Bowel sounds are normal.      Palpations: Abdomen is soft.      Tenderness: There is abdominal tenderness.   Musculoskeletal:         General: Normal range of motion.      Cervical back: Normal range of motion.   Skin:     General: Skin is warm.   Neurological:      Mental Status: He is alert and oriented to person, place, and time.   Psychiatric:         Behavior: Behavior normal.         Thought Content: Thought content normal.         Judgment: Judgment normal.           Emotional Behavior:    Normal    Debilities:   None  Results Review:    I reviewed the patient's new clinical results.  Lab Results (most recent)       Procedure Component Value Units Date/Time    Gastrointestinal Panel, PCR - Stool, Per Rectum [[id number]]  (Abnormal) Collected: 11/04/24 0748    Specimen: Stool from Per Rectum Updated: 11/04/24 0941     Campylobacter Detected     Plesiomonas shigelloides Not Detected     Salmonella Not Detected     Vibrio Not Detected     Vibrio cholerae Not Detected     Yersinia enterocolitica Not Detected     Enteroaggregative E. coli (EAEC) Not Detected     Enteropathogenic E. coli (EPEC) Not Detected     Enterotoxigenic E. coli (ETEC) lt/st Not Detected     Shiga-like toxin-producing E. coli (STEC) stx1/stx2 Not Detected     Shigella/Enteroinvasive E. coli (EIEC) Not Detected     Cryptosporidium Not Detected     Cyclospora cayetanensis Not Detected     Entamoeba histolytica Not Detected     Giardia lamblia Not Detected     Adenovirus F40/41 Not Detected     Astrovirus Not Detected     Norovirus GI/GII Not Detected     Rotavirus A Not Detected     Sapovirus (I, II, IV or V) Not Detected    Clostridioides difficile Toxin - Stool, Per Rectum [[id number]]  (Normal) Collected: 11/04/24 0748    Specimen: Stool from Per Rectum Updated: 11/04/24 0840    Narrative:      The following orders were created for panel  order Clostridioides difficile Toxin - Stool, Per Rectum.  Procedure                               Abnormality         Status                     ---------                               -----------         ------                     Clostridioides difficile...[[id number]]  Normal              Final result                 Please view results for these tests on the individual orders.    Clostridioides difficile EIA - Stool, Per Rectum [[id number]]  (Normal) Collected: 11/04/24 0748    Specimen: Stool from Per Rectum Updated: 11/04/24 0840     C Diff GDH Ag Negative     C.diff Toxin Ag Negative    Narrative:      The result indicates the absence of toxigenic C.difficile from stool specimen.    Magnesium [[id number]]  (Normal) Collected: 11/04/24 0051    Specimen: Blood from Arm, Right Updated: 11/04/24 0800     Magnesium 1.6 mg/dL     Basic Metabolic Panel [[id number]]  (Abnormal) Collected: 11/04/24 0051    Specimen: Blood from Arm, Right Updated: 11/04/24 0152     Glucose 89 mg/dL      BUN 7 mg/dL      Creatinine 0.86 mg/dL      Sodium 138 mmol/L      Potassium 3.4 mmol/L      Comment: Specimen hemolyzed.  Result may be falsely elevated.        Chloride 98 mmol/L      CO2 29.4 mmol/L      Calcium 9.4 mg/dL      BUN/Creatinine Ratio 8.1     Anion Gap 10.6 mmol/L      eGFR 99.1 mL/min/1.73     Narrative:      GFR Normal >60  Chronic Kidney Disease <60  Kidney Failure <15      CBC Auto Differential [[id number]]  (Abnormal) Collected: 11/04/24 0051    Specimen: Blood from Arm, Right Updated: 11/04/24 0103     WBC 11.15 10*3/mm3      RBC 5.39 10*6/mm3      Hemoglobin 15.8 g/dL      Hematocrit 46.9 %      MCV 87.0 fL      MCH 29.3 pg      MCHC 33.7 g/dL      RDW 13.2 %      RDW-SD 41.6 fl      MPV 10.9 fL      Platelets 222 10*3/mm3      Neutrophil % 74.6 %      Lymphocyte % 14.7 %      Monocyte % 9.1 %      Eosinophil % 0.8 %      Basophil % 0.4 %      Immature Grans % 0.4 %      Neutrophils, Absolute 8.31 10*3/mm3       "Lymphocytes, Absolute 1.64 10*3/mm3      Monocytes, Absolute 1.01 10*3/mm3      Eosinophils, Absolute 0.09 10*3/mm3      Basophils, Absolute 0.05 10*3/mm3      Immature Grans, Absolute 0.05 10*3/mm3      nRBC 0.0 /100 WBC     Urinalysis With Culture If Indicated - Urine, Clean Catch [[id number]]  (Normal) Collected: 11/03/24 1955    Specimen: Urine, Clean Catch Updated: 11/03/24 2020     Color, UA Yellow     Appearance, UA Clear     pH, UA 5.5     Specific Gravity, UA 1.024     Glucose, UA Negative     Ketones, UA Negative     Bilirubin, UA Negative     Blood, UA Negative     Protein, UA Negative     Leuk Esterase, UA Negative     Nitrite, UA Negative     Urobilinogen, UA 0.2 E.U./dL    Narrative:      In absence of clinical symptoms, the presence of pyuria, bacteria, and/or nitrites on the urinalysis result does not correlate with infection.  Urine microscopic not indicated.    Procalcitonin [[id number]]  (Normal) Collected: 11/03/24 1738    Specimen: Blood Updated: 11/03/24 1921     Procalcitonin 0.08 ng/mL     Narrative:      As a Marker for Sepsis (Non-Neonates):    1. <0.5 ng/mL represents a low risk of severe sepsis and/or septic shock.  2. >2 ng/mL represents a high risk of severe sepsis and/or septic shock.    As a Marker for Lower Respiratory Tract Infections that require antibiotic therapy:    PCT on Admission    Antibiotic Therapy       6-12 Hrs later    >0.5                Strongly Recommended  >0.25 - <0.5        Recommended   0.1 - 0.25          Discouraged              Remeasure/reassess PCT  <0.1                Strongly Discouraged     Remeasure/reassess PCT    As 28 day mortality risk marker: \"Change in Procalcitonin Result\" (>80% or <=80%) if Day 0 (or Day 1) and Day 4 values are available. Refer to http://www.[facility]-pct-calculator.com    Change in PCT <=80%  A decrease of PCT levels below or equal to 80% defines a positive change in PCT test result representing a higher risk for 28-day all-cause " mortality of patients diagnosed with severe sepsis for septic shock.    Change in PCT >80%  A decrease of PCT levels of more than 80% defines a negative change in PCT result representing a lower risk for 28-day all-cause mortality of patients diagnosed with severe sepsis or septic shock.       Respiratory Panel PCR w/COVID-19(SARS-CoV-2) ANNEL/ATIF/JUAN/PAD/COR/GISSELLE In-House, NP Swab in UTM/VTM, 2 HR TAT - Swab, Nasopharynx [[id number]]  (Normal) Collected: 11/03/24 1745    Specimen: Swab from Nasopharynx Updated: 11/03/24 1840     ADENOVIRUS, PCR Not Detected     Coronavirus 229E Not Detected     Coronavirus HKU1 Not Detected     Coronavirus NL63 Not Detected     Coronavirus OC43 Not Detected     COVID19 Not Detected     Human Metapneumovirus Not Detected     Human Rhinovirus/Enterovirus Not Detected     Influenza A PCR Not Detected     Influenza B PCR Not Detected     Parainfluenza Virus 1 Not Detected     Parainfluenza Virus 2 Not Detected     Parainfluenza Virus 3 Not Detected     Parainfluenza Virus 4 Not Detected     RSV, PCR Not Detected     Bordetella pertussis pcr Not Detected     Bordetella parapertussis PCR Not Detected     Chlamydophila pneumoniae PCR Not Detected     Mycoplasma pneumo by PCR Not Detected    Narrative:      In the setting of a positive respiratory panel with a viral infection PLUS a negative procalcitonin without other underlying concern for bacterial infection, consider observing off antibiotics or discontinuation of antibiotics and continue supportive care. If the respiratory panel is positive for atypical bacterial infection (Bordetella pertussis, Chlamydophila pneumoniae, or Mycoplasma pneumoniae), consider antibiotic de-escalation to target atypical bacterial infection.    Rapid Strep A Screen - Swab, Throat [[id number]]  (Normal) Collected: 11/03/24 1806    Specimen: Swab from Throat Updated: 11/03/24 1826     Strep A Ag Negative    Comprehensive Metabolic Panel [[id number]]  (Abnormal)  Collected: 11/03/24 1738    Specimen: Blood Updated: 11/03/24 1813     Glucose 86 mg/dL      BUN 9 mg/dL      Creatinine 0.92 mg/dL      Sodium 139 mmol/L      Potassium 3.1 mmol/L      Chloride 98 mmol/L      CO2 28.0 mmol/L      Calcium 9.7 mg/dL      Total Protein 7.5 g/dL      Albumin 4.6 g/dL      ALT (SGPT) 10 U/L      AST (SGOT) 21 U/L      Alkaline Phosphatase 89 U/L      Total Bilirubin 1.2 mg/dL      Globulin 2.9 gm/dL      A/G Ratio 1.6 g/dL      BUN/Creatinine Ratio 9.8     Anion Gap 13.0 mmol/L      eGFR 95.2 mL/min/1.73     Narrative:      GFR Normal >60  Chronic Kidney Disease <60  Kidney Failure <15      Lipase [[id number]]  (Normal) Collected: 11/03/24 1738    Specimen: Blood Updated: 11/03/24 1813     Lipase 31 U/L     Amylase [[id number]]  (Normal) Collected: 11/03/24 1738    Specimen: Blood Updated: 11/03/24 1813     Amylase 64 U/L     Blood Culture - Blood, Arm, Right [[id number]] Collected: 11/03/24 1806    Specimen: Blood from Arm, Right Updated: 11/03/24 1812    CBC & Differential [[id number]]  (Abnormal) Collected: 11/03/24 1738    Specimen: Blood Updated: 11/03/24 1747    Narrative:      The following orders were created for panel order CBC & Differential.  Procedure                               Abnormality         Status                     ---------                               -----------         ------                     CBC Auto Differential[[id number]]        Abnormal            Final result                 Please view results for these tests on the individual orders.    CBC Auto Differential [[id number]]  (Abnormal) Collected: 11/03/24 1738    Specimen: Blood Updated: 11/03/24 1747     WBC 12.98 10*3/mm3      RBC 5.41 10*6/mm3      Hemoglobin 16.3 g/dL      Hematocrit 47.2 %      MCV 87.2 fL      MCH 30.1 pg      MCHC 34.5 g/dL      RDW 12.9 %      RDW-SD 41.1 fl      MPV 10.9 fL      Platelets 246 10*3/mm3      Neutrophil % 77.3 %      Lymphocyte % 12.6 %      Monocyte % 8.3 %       Eosinophil % 0.8 %      Basophil % 0.5 %      Immature Grans % 0.5 %      Neutrophils, Absolute 10.02 10*3/mm3      Lymphocytes, Absolute 1.64 10*3/mm3      Monocytes, Absolute 1.08 10*3/mm3      Eosinophils, Absolute 0.10 10*3/mm3      Basophils, Absolute 0.07 10*3/mm3      Immature Grans, Absolute 0.07 10*3/mm3      nRBC 0.0 /100 WBC     [place] Draw [[id number]] Collected: 11/03/24 1738    Specimen: Blood Updated: 11/03/24 1746    Narrative:      The following orders were created for panel order [place] Draw.  Procedure                               Abnormality         Status                     ---------                               -----------         ------                     Green Top (Gel)[[id number]]                                  Final result               Lavender Top[[id number]]                                     Final result               Gold Top - SST[[id number]]                                   Final result               Light Blue Top[[id number]]                                   Final result                 Please view results for these tests on the individual orders.    Green Top (Gel) [[id number]] Collected: 11/03/24 1738    Specimen: Blood Updated: 11/03/24 1746     Extra Tube Hold for add-ons.     Comment: Auto resulted.       Lavender Top [[id number]] Collected: 11/03/24 1738    Specimen: Blood Updated: 11/03/24 1746     Extra Tube hold for add-on     Comment: Auto resulted       Gold Top - SST [[id number]] Collected: 11/03/24 1738    Specimen: Blood Updated: 11/03/24 1746     Extra Tube Hold for add-ons.     Comment: Auto resulted.       Light Blue Top [[id number]] Collected: 11/03/24 1738    Specimen: Blood Updated: 11/03/24 1746     Extra Tube Hold for add-ons.     Comment: Auto resulted       Blood Culture - Blood, Arm, Left [[id number]] Collected: 11/03/24 1738    Specimen: Blood from Arm, Left Updated: 11/03/24 1744    POC Lactate [[id number]]  (Normal) Collected: 11/03/24 1741    Specimen:  Blood Updated: 11/03/24 1743     Lactate 0.8 mmol/L      Comment: Serial Number: [id number]:  [id number]               Imaging Results (Most Recent)       Procedure Component Value Units Date/Time    CT Abdomen Pelvis With Contrast [[id number]] Collected: 11/03/24 1921     Updated: 11/03/24 1940    Narrative:      CT ABDOMEN PELVIS W CONTRAST    Date of Exam: 11/3/2024 7:09 PM EST    Indication: generalized abd pain-worse in LLQ, nausea without vomiting, diarrhea.    Comparison: CT abdomen pelvis dated 5/3/2024    Technique: Axial CT images were obtained of the abdomen and pelvis following the uneventful intravenous administration of iodinated contrast. Sagittal and coronal reconstructions were performed.  Automated exposure control and iterative reconstruction   methods were used.      Findings:  Lung Bases:    The visualized lung bases and lower mediastinal structures are unremarkable.      Liver:Liver is normal in size and CT density. No focal lesions.      Biliary/Gallbladder: The gallbladder is without evidence of radiopaque stones. The biliary tree is nondilated.      Spleen:Spleen is normal in size and CT density.    Pancreas:   Pancreas shows homogeneous density. There is no evidence of pancreatic mass or peripancreatic fluid.      Kidneys: Kidneys are normal in size. There are no stones or hydronephrosis. Simple cyst upper pole right kidney      Adrenals: Adrenal glands are unremarkable.      Retroperitoneal/Lymph Nodes/Vasculature: No retroperitoneal adenopathy is identified by size criteria.      Gastrointestinal/Mesentery: The bowel loops are non-dilated. Multiple diverticula in the sigmoid colon with a short segment of wall thickening and pericolonic fat stranding compatible with acute diverticulitis. No associated free fluid or free air. The   appendix appears within normal limits. No evidence of obstruction. No free air.      Bladder: The bladder is unremarkable    There is no free fluid.       Bony Structures:  Visualized bony structures are consistent with the patient's age.        Impression:      Impression:    Findings compatible with uncomplicated sigmoid diverticulitis. Follow-up to complete resolution recommended            Electronically Signed: Cam Dias MD    11/3/2024 7:38 PM EST    Workstation ID: [id number]          reviewed    ECG/EMG Results (most recent)       Procedure Component Value Units Date/Time    Telemetry Scan [[id number]] Resulted: 11/03/24     Updated: 11/04/24 0813    Telemetry Scan [[id number]] Resulted: 11/03/24     Updated: 11/04/24 1040          reviewed            Microbiology Results (last 10 days)       Procedure Component Value - Date/Time    Clostridioides difficile Toxin - Stool, Per Rectum [[id number]]  (Normal) Collected: 11/04/24 0748    Lab Status: Final result Specimen: Stool from Per Rectum Updated: 11/04/24 0840    Narrative:      The following orders were created for panel order Clostridioides difficile Toxin - Stool, Per Rectum.  Procedure                               Abnormality         Status                     ---------                               -----------         ------                     Clostridioides difficile...[[id number]]  Normal              Final result                 Please view results for these tests on the individual orders.    Gastrointestinal Panel, PCR - Stool, Per Rectum [[id number]]  (Abnormal) Collected: 11/04/24 0748    Lab Status: Final result Specimen: Stool from Per Rectum Updated: 11/04/24 0941     Campylobacter Detected     Plesiomonas shigelloides Not Detected     Salmonella Not Detected     Vibrio Not Detected     Vibrio cholerae Not Detected     Yersinia enterocolitica Not Detected     Enteroaggregative E. coli (EAEC) Not Detected     Enteropathogenic E. coli (EPEC) Not Detected     Enterotoxigenic E. coli (ETEC) lt/st Not Detected     Shiga-like toxin-producing E. coli (STEC) stx1/stx2 Not Detected      Shigella/Enteroinvasive E. coli (EIEC) Not Detected     Cryptosporidium Not Detected     Cyclospora cayetanensis Not Detected     Entamoeba histolytica Not Detected     Giardia lamblia Not Detected     Adenovirus F40/41 Not Detected     Astrovirus Not Detected     Norovirus GI/GII Not Detected     Rotavirus A Not Detected     Sapovirus (I, II, IV or V) Not Detected    Clostridioides difficile EIA - Stool, Per Rectum [[id number]]  (Normal) Collected: 11/04/24 0748    Lab Status: Final result Specimen: Stool from Per Rectum Updated: 11/04/24 0840     C Diff GDH Ag Negative     C.diff Toxin Ag Negative    Narrative:      The result indicates the absence of toxigenic C.difficile from stool specimen.    Rapid Strep A Screen - Swab, Throat [[id number]]  (Normal) Collected: 11/03/24 1806    Lab Status: Final result Specimen: Swab from Throat Updated: 11/03/24 1826     Strep A Ag Negative    Respiratory Panel PCR w/COVID-19(SARS-CoV-2) ANNEL/ATIF/JUAN/PAD/COR/GISSELLE In-House, NP Swab in UTM/VTM, 2 HR TAT - Swab, Nasopharynx [[id number]]  (Normal) Collected: 11/03/24 1745    Lab Status: Final result Specimen: Swab from Nasopharynx Updated: 11/03/24 1840     ADENOVIRUS, PCR Not Detected     Coronavirus 229E Not Detected     Coronavirus HKU1 Not Detected     Coronavirus NL63 Not Detected     Coronavirus OC43 Not Detected     COVID19 Not Detected     Human Metapneumovirus Not Detected     Human Rhinovirus/Enterovirus Not Detected     Influenza A PCR Not Detected     Influenza B PCR Not Detected     Parainfluenza Virus 1 Not Detected     Parainfluenza Virus 2 Not Detected     Parainfluenza Virus 3 Not Detected     Parainfluenza Virus 4 Not Detected     RSV, PCR Not Detected     Bordetella pertussis pcr Not Detected     Bordetella parapertussis PCR Not Detected     Chlamydophila pneumoniae PCR Not Detected     Mycoplasma pneumo by PCR Not Detected    Narrative:      In the setting of a positive respiratory panel with a viral infection PLUS  a negative procalcitonin without other underlying concern for bacterial infection, consider observing off antibiotics or discontinuation of antibiotics and continue supportive care. If the respiratory panel is positive for atypical bacterial infection (Bordetella pertussis, Chlamydophila pneumoniae, or Mycoplasma pneumoniae), consider antibiotic de-escalation to target atypical bacterial infection.            Assessment & Plan     Diverticulitis       Diverticulitis  Lab Results   Component Value Date    WBC 11.15 (H) 11/04/2024    AST 21 11/03/2024    ALT 10 11/03/2024    ALKPHOS 89 11/03/2024    BILITOT 1.2 11/03/2024    LIPASE 31 11/03/2024   -CMP showed mild hypokalemia and patient received potassium per protocol  -WBC 12.98 on admission 11.15 at discharge  -UA negative  -Cdiff negative  -GI panel positive for Campylobacter  -Azithromycin 1 g p.o. x 1  -CT of abdomen and pelvis: Showed uncomplicated sigmoid diverticulitis  -In the ED patient given IV Dilaudid without much relief  -Toradol in the office without much relief  -GI was consulted and recommended discharge later today versus tomorrow  -Patient tolerating low residue diet well and eager for discharge today  -Amox-clav x 10 days at discharge  -Blood cultures pending results    Hypertension  -Well controlled   BP Readings from Last 1 Encounters:   11/04/24 118/72   - Continue losartan and HCTZ  - Monitor while admitted     GERD  -Continue PPI    I discussed the patients findings and my recommendations with patient and nursing staff.     Discharge Diagnosis:      Diverticulitis      Hospital Course  Patient is a 59 y.o. male presented with abdominal pain as noted in HPI above.  CMP and CBC generally unremarkable.  UA and C. difficile negative.  GI panel positive for Campylobacter.  Patient was admitted to the observation unit for GI consultation.  He was given azithromycin x 1 and started on amox-clav.  He tolerated diet well. At this time, patient felt to  be in good condition for discharge with close follow up with PCP and GI. Instructed to take all medications as prescribed and to return to ED if any concerning signs/symptoms. All test/lab results were discussed with patient. All questions were answered and patient verbalizes understanding.    .      Past Medical History:     Past Medical History:   Diagnosis Date    Hypertension        Past Surgical History:     Past Surgical History:   Procedure Laterality Date    APPENDECTOMY         Social History:   Social History     Socioeconomic History    Marital status: Single   Tobacco Use    Smoking status: Never    Smokeless tobacco: Never   Vaping Use    Vaping status: Never Used   Substance and Sexual Activity    Alcohol use: Yes     Comment: Socially    Drug use: Never    Sexual activity: Defer       Procedures Performed         Consults:   Consults       Date and Time Order Name Status Description    11/3/2024 11:05 PM IP Consult to Gastroenterology Completed             Condition on Discharge:     Stable    Discharge Disposition  Home or Self Care    Discharge Medications     Discharge Medications        New Medications        Instructions Start Date   ondansetron ODT 4 MG disintegrating tablet  Commonly known as: ZOFRAN-ODT   4 mg, Translingual, Every 8 Hours PRN             Changes to Medications        Instructions Start Date   amoxicillin-clavulanate 875-125 MG per tablet  Commonly known as: AUGMENTIN  What changed: when to take this   1 tablet, Oral, Every 8 Hours             Continue These Medications        Instructions Start Date   esomeprazole 40 MG capsule  Commonly known as: nexIUM   40 mg, Daily      irbesartan-hydrochlorothiazide 150-12.5 MG tablet  Commonly known as: AVALIDE   1 tablet, Daily               Discharge Diet:   Diet Instructions       Diet: Gastrointestinal Diets; Fiber-Restricted; Soft to Chew (NDD 3); Whole Meat; Thin (IDDSI 0)      Discharge Diet: Gastrointestinal Diets     Gastrointestinal Diet: Fiber-Restricted    Texture: Soft to Chew (NDD 3)    Soft to Chew: Whole Meat    Fluid Consistency: Thin (IDDSI 0)            Activity at Discharge:     Follow-up Appointments  No future appointments.  Additional Instructions for the Follow-ups that You Need to Schedule       Discharge Follow-up with PCP   As directed       Currently Documented PCP:    Mervat Pike APRN    PCP Phone Number:    [phone]     Follow Up Details: 5-7 days        Discharge Follow-up with Specified Provider: GI   As directed      To: GI   Follow Up Details: scheduled egd appt                Test Results Pending at Discharge  Pending Labs       Order Current Status    Blood Culture - Blood, Arm, Left In process    Blood Culture - Blood, Arm, Right In process             Risk for Readmission (LACE) Score: 1 (11/4/2024  6:00 AM)      Greater than 30 minutes spent in discharge activities for this patient    Signature:Electronically signed by KELLY Rivero, 11/04/24, 1:53 PM EST.             Electronically signed by Anna Winslow APRN at 11/04/24 1402

## 2024-11-04 NOTE — PLAN OF CARE
Problem: Adult Inpatient Plan of Care  Goal: Plan of Care Review  Outcome: Progressing  Flowsheets (Taken 11/4/2024 0133)  Progress: no change  Outcome Evaluation: Patient NPO for GI consult. Potassium 3.1 in ED and given 2 doses of oral potassium along with 500 mL bolus of NS. Having C/O ABD pain. Medication administration effective. Put on 2L O2 r/t SPO2 desat after admiinistration of pain medication.  Plan of Care Reviewed With: patient  Goal: Patient-Specific Goal (Individualized)  Outcome: Progressing  Goal: Absence of Hospital-Acquired Illness or Injury  Outcome: Progressing  Intervention: Identify and Manage Fall Risk  Recent Flowsheet Documentation  Taken 11/4/2024 0000 by Candida Richter RN  Safety Promotion/Fall Prevention: safety round/check completed  Taken 11/3/2024 2220 by Candida Richter RN  Safety Promotion/Fall Prevention: safety round/check completed  Intervention: Prevent Skin Injury  Recent Flowsheet Documentation  Taken 11/3/2024 2220 by Candida Richter RN  Body Position: position changed independently  Skin Protection: transparent dressing maintained  Intervention: Prevent and Manage VTE (Venous Thromboembolism) Risk  Recent Flowsheet Documentation  Taken 11/3/2024 2220 by Candida Richter RN  VTE Prevention/Management: SCDs (sequential compression devices) off  Intervention: Prevent Infection  Recent Flowsheet Documentation  Taken 11/4/2024 0000 by Candida Richter RN  Infection Prevention: hand hygiene promoted  Taken 11/3/2024 2220 by Candida Richter RN  Infection Prevention: hand hygiene promoted  Goal: Optimal Comfort and Wellbeing  Outcome: Progressing  Intervention: Monitor Pain and Promote Comfort  Recent Flowsheet Documentation  Taken 11/3/2024 2220 by Candida Richter RN  Pain Management Interventions: pain medication given  Intervention: Provide Person-Centered Care  Recent Flowsheet Documentation  Taken 11/3/2024 2220 by Candida Richter RN  Trust Relationship/Rapport:   questions  answered   questions encouraged  Goal: Readiness for Transition of Care  Outcome: Progressing  Intervention: Mutually Develop Transition Plan  Recent Flowsheet Documentation  Taken 11/3/2024 2215 by Candida Richter, RN  Transportation Anticipated: family or friend will provide  Patient/Family Anticipated Services at Transition: none  Patient/Family Anticipates Transition to: home with family  Taken 11/3/2024 2212 by Candida Richter, RN  Equipment Currently Used at Home: none   Goal Outcome Evaluation:  Plan of Care Reviewed With: patient        Progress: no change  Outcome Evaluation: Patient NPO for GI consult. Potassium 3.1 in ED and given 2 doses of oral potassium along with 500 mL bolus of NS. Having C/O ABD pain. Medication administration effective. Put on 2L O2 r/t SPO2 desat after admiinistration of pain medication.

## 2024-11-04 NOTE — DISCHARGE SUMMARY
"[facility]    Mervat Pike APRN    CHIEF COMPLAINT:     Abdominal pain     HISTORY OF PRESENT ILLNESS:    Abdominal Pain  Associated symptoms include diarrhea, a fever and nausea. Pertinent negatives include no vomiting.       History of Present Illness  ED 11/03/2024  59-year-old male with history of hiatal hernia and gastric ulcer presents the ED with complaints of generalized abdominal pain worsening in the left lower quadrant along with nausea without vomiting and diarrhea that has been ongoing for approximately a week and worse over the last 2 days.  Reports approximately 6-8 episodes of diarrhea daily.  States he developed a fever today.  Also reports a mildly sore throat however this has been ongoing and not new.  Patient states he has a small macular rash to the cheeks and top of the head whenever his potassium is low and this is started develop over the last few days.  Denies chest pain, shortness of breath, congestion, cough, recent travel or sick contacts, dizziness, vision changes, melena, hematochezia, UTI symptoms, difficulty swallowing or difficulty handling secretions.  Reports he had an EGD completed on 4/29/2024 at [facility] and this showed a nonbleeding gastric ulcer and hiatal hernia.  He does not have a follow-up until 12/26/2024 with GI     Observation 11/04/2024  Patient agrees with HPI noted above including bilateral lower abdominal pain, left worse than right since Friday.  He was prescribed amox-clav but has not yet started.  He currently rates pain 7/10.  He states that pain is worse with exertion and associated with diarrhea.  He estimates about 8-10 bowel movements in the last 24 hours.  Denies any GI bleed.     Reassessed this afternoon and discussed test results. He states he doesn't feel \"back to normal\" but would like to be discharged home. He states dilaudid given in ED and toradol in obs did not help his pain. He declines any po pain meds. He was able to " tolerate gi soft diet well although reports decreased appetite. No vomiting.      Past Medical History:   Diagnosis Date    Hypertension      Past Surgical History:   Procedure Laterality Date    APPENDECTOMY       History reviewed. No pertinent family history.  Social History     Tobacco Use    Smoking status: Never    Smokeless tobacco: Never   Vaping Use    Vaping status: Never Used   Substance Use Topics    Alcohol use: Yes     Comment: Socially    Drug use: Never     Medications Prior to Admission   Medication Sig Dispense Refill Last Dose/Taking    esomeprazole (nexIUM) 40 MG capsule Take 1 capsule by mouth Daily.   11/3/2024 at  7:00 AM    irbesartan-hydrochlorothiazide (AVALIDE) 150-12.5 MG tablet Take 1 tablet by mouth Daily.   11/3/2024 at  8:00 AM    [DISCONTINUED] amoxicillin-clavulanate (AUGMENTIN) 875-125 MG per tablet Take 1 tablet by mouth Every 12 (Twelve) Hours.   Taking     Allergies:  Clindamycin/lincomycin    Immunization History   Administered Date(s) Administered    COVID-19 (PFIZER) Purple Cap Monovalent 03/09/2021, 03/30/2021, 12/08/2021           REVIEW OF SYSTEMS:    Review of Systems   Constitutional: Positive for fever.   Gastrointestinal:  Positive for abdominal pain, diarrhea and nausea. Negative for vomiting.   All other systems reviewed and are negative.        Vital Signs  Temp:  [98.1 °F (36.7 °C)-101.4 °F (38.6 °C)] 98.3 °F (36.8 °C)  Heart Rate:  [] 88  Resp:  [17-22] 20  BP: (100-146)/(66-89) 118/72          Physical Exam:  Physical Exam  Vitals and nursing note reviewed.   Constitutional:       Appearance: Normal appearance.   HENT:      Head: Normocephalic and atraumatic.      Right Ear: External ear normal.      Left Ear: External ear normal.      Nose: Nose normal.      Mouth/Throat:      Mouth: Mucous membranes are moist.   Eyes:      Extraocular Movements: Extraocular movements intact.   Cardiovascular:      Rate and Rhythm: Normal rate and regular rhythm.       Pulses: Normal pulses.      Heart sounds: Normal heart sounds.   Pulmonary:      Effort: Pulmonary effort is normal.      Breath sounds: Normal breath sounds.   Abdominal:      General: Abdomen is flat. Bowel sounds are normal.      Palpations: Abdomen is soft.      Tenderness: There is abdominal tenderness.   Musculoskeletal:         General: Normal range of motion.      Cervical back: Normal range of motion.   Skin:     General: Skin is warm.   Neurological:      Mental Status: He is alert and oriented to person, place, and time.   Psychiatric:         Behavior: Behavior normal.         Thought Content: Thought content normal.         Judgment: Judgment normal.           Emotional Behavior:    Normal    Debilities:   None  Results Review:    I reviewed the patient's new clinical results.  Lab Results (most recent)       Procedure Component Value Units Date/Time    Gastrointestinal Panel, PCR - Stool, Per Rectum [[id number]]  (Abnormal) Collected: 11/04/24 0748    Specimen: Stool from Per Rectum Updated: 11/04/24 0941     Campylobacter Detected     Plesiomonas shigelloides Not Detected     Salmonella Not Detected     Vibrio Not Detected     Vibrio cholerae Not Detected     Yersinia enterocolitica Not Detected     Enteroaggregative E. coli (EAEC) Not Detected     Enteropathogenic E. coli (EPEC) Not Detected     Enterotoxigenic E. coli (ETEC) lt/st Not Detected     Shiga-like toxin-producing E. coli (STEC) stx1/stx2 Not Detected     Shigella/Enteroinvasive E. coli (EIEC) Not Detected     Cryptosporidium Not Detected     Cyclospora cayetanensis Not Detected     Entamoeba histolytica Not Detected     Giardia lamblia Not Detected     Adenovirus F40/41 Not Detected     Astrovirus Not Detected     Norovirus GI/GII Not Detected     Rotavirus A Not Detected     Sapovirus (I, II, IV or V) Not Detected    Clostridioides difficile Toxin - Stool, Per Rectum [[id number]]  (Normal) Collected: 11/04/24 0748    Specimen: Stool from  Per Rectum Updated: 11/04/24 0840    Narrative:      The following orders were created for panel order Clostridioides difficile Toxin - Stool, Per Rectum.  Procedure                               Abnormality         Status                     ---------                               -----------         ------                     Clostridioides difficile...[[id number]]  Normal              Final result                 Please view results for these tests on the individual orders.    Clostridioides difficile EIA - Stool, Per Rectum [[id number]]  (Normal) Collected: 11/04/24 0748    Specimen: Stool from Per Rectum Updated: 11/04/24 0840     C Diff GDH Ag Negative     C.diff Toxin Ag Negative    Narrative:      The result indicates the absence of toxigenic C.difficile from stool specimen.    Magnesium [[id number]]  (Normal) Collected: 11/04/24 0051    Specimen: Blood from Arm, Right Updated: 11/04/24 0800     Magnesium 1.6 mg/dL     Basic Metabolic Panel [[id number]]  (Abnormal) Collected: 11/04/24 0051    Specimen: Blood from Arm, Right Updated: 11/04/24 0152     Glucose 89 mg/dL      BUN 7 mg/dL      Creatinine 0.86 mg/dL      Sodium 138 mmol/L      Potassium 3.4 mmol/L      Comment: Specimen hemolyzed.  Result may be falsely elevated.        Chloride 98 mmol/L      CO2 29.4 mmol/L      Calcium 9.4 mg/dL      BUN/Creatinine Ratio 8.1     Anion Gap 10.6 mmol/L      eGFR 99.1 mL/min/1.73     Narrative:      GFR Normal >60  Chronic Kidney Disease <60  Kidney Failure <15      CBC Auto Differential [[id number]]  (Abnormal) Collected: 11/04/24 0051    Specimen: Blood from Arm, Right Updated: 11/04/24 0103     WBC 11.15 10*3/mm3      RBC 5.39 10*6/mm3      Hemoglobin 15.8 g/dL      Hematocrit 46.9 %      MCV 87.0 fL      MCH 29.3 pg      MCHC 33.7 g/dL      RDW 13.2 %      RDW-SD 41.6 fl      MPV 10.9 fL      Platelets 222 10*3/mm3      Neutrophil % 74.6 %      Lymphocyte % 14.7 %      Monocyte % 9.1 %      Eosinophil % 0.8 %       "Basophil % 0.4 %      Immature Grans % 0.4 %      Neutrophils, Absolute 8.31 10*3/mm3      Lymphocytes, Absolute 1.64 10*3/mm3      Monocytes, Absolute 1.01 10*3/mm3      Eosinophils, Absolute 0.09 10*3/mm3      Basophils, Absolute 0.05 10*3/mm3      Immature Grans, Absolute 0.05 10*3/mm3      nRBC 0.0 /100 WBC     Urinalysis With Culture If Indicated - Urine, Clean Catch [[id number]]  (Normal) Collected: 11/03/24 1955    Specimen: Urine, Clean Catch Updated: 11/03/24 2020     Color, UA Yellow     Appearance, UA Clear     pH, UA 5.5     Specific Gravity, UA 1.024     Glucose, UA Negative     Ketones, UA Negative     Bilirubin, UA Negative     Blood, UA Negative     Protein, UA Negative     Leuk Esterase, UA Negative     Nitrite, UA Negative     Urobilinogen, UA 0.2 E.U./dL    Narrative:      In absence of clinical symptoms, the presence of pyuria, bacteria, and/or nitrites on the urinalysis result does not correlate with infection.  Urine microscopic not indicated.    Procalcitonin [[id number]]  (Normal) Collected: 11/03/24 1738    Specimen: Blood Updated: 11/03/24 1921     Procalcitonin 0.08 ng/mL     Narrative:      As a Marker for Sepsis (Non-Neonates):    1. <0.5 ng/mL represents a low risk of severe sepsis and/or septic shock.  2. >2 ng/mL represents a high risk of severe sepsis and/or septic shock.    As a Marker for Lower Respiratory Tract Infections that require antibiotic therapy:    PCT on Admission    Antibiotic Therapy       6-12 Hrs later    >0.5                Strongly Recommended  >0.25 - <0.5        Recommended   0.1 - 0.25          Discouraged              Remeasure/reassess PCT  <0.1                Strongly Discouraged     Remeasure/reassess PCT    As 28 day mortality risk marker: \"Change in Procalcitonin Result\" (>80% or <=80%) if Day 0 (or Day 1) and Day 4 values are available. Refer to http://www.[facility]-pct-calculator.com    Change in PCT <=80%  A decrease of PCT levels below or equal to 80% " defines a positive change in PCT test result representing a higher risk for 28-day all-cause mortality of patients diagnosed with severe sepsis for septic shock.    Change in PCT >80%  A decrease of PCT levels of more than 80% defines a negative change in PCT result representing a lower risk for 28-day all-cause mortality of patients diagnosed with severe sepsis or septic shock.       Respiratory Panel PCR w/COVID-19(SARS-CoV-2) ANNEL/ATIF/JUAN/PAD/COR/GISSELLE In-House, NP Swab in UTM/VTM, 2 HR TAT - Swab, Nasopharynx [[id number]]  (Normal) Collected: 11/03/24 1745    Specimen: Swab from Nasopharynx Updated: 11/03/24 1840     ADENOVIRUS, PCR Not Detected     Coronavirus 229E Not Detected     Coronavirus HKU1 Not Detected     Coronavirus NL63 Not Detected     Coronavirus OC43 Not Detected     COVID19 Not Detected     Human Metapneumovirus Not Detected     Human Rhinovirus/Enterovirus Not Detected     Influenza A PCR Not Detected     Influenza B PCR Not Detected     Parainfluenza Virus 1 Not Detected     Parainfluenza Virus 2 Not Detected     Parainfluenza Virus 3 Not Detected     Parainfluenza Virus 4 Not Detected     RSV, PCR Not Detected     Bordetella pertussis pcr Not Detected     Bordetella parapertussis PCR Not Detected     Chlamydophila pneumoniae PCR Not Detected     Mycoplasma pneumo by PCR Not Detected    Narrative:      In the setting of a positive respiratory panel with a viral infection PLUS a negative procalcitonin without other underlying concern for bacterial infection, consider observing off antibiotics or discontinuation of antibiotics and continue supportive care. If the respiratory panel is positive for atypical bacterial infection (Bordetella pertussis, Chlamydophila pneumoniae, or Mycoplasma pneumoniae), consider antibiotic de-escalation to target atypical bacterial infection.    Rapid Strep A Screen - Swab, Throat [[id number]]  (Normal) Collected: 11/03/24 1806    Specimen: Swab from Throat Updated:  11/03/24 1826     Strep A Ag Negative    Comprehensive Metabolic Panel [[id number]]  (Abnormal) Collected: 11/03/24 1738    Specimen: Blood Updated: 11/03/24 1813     Glucose 86 mg/dL      BUN 9 mg/dL      Creatinine 0.92 mg/dL      Sodium 139 mmol/L      Potassium 3.1 mmol/L      Chloride 98 mmol/L      CO2 28.0 mmol/L      Calcium 9.7 mg/dL      Total Protein 7.5 g/dL      Albumin 4.6 g/dL      ALT (SGPT) 10 U/L      AST (SGOT) 21 U/L      Alkaline Phosphatase 89 U/L      Total Bilirubin 1.2 mg/dL      Globulin 2.9 gm/dL      A/G Ratio 1.6 g/dL      BUN/Creatinine Ratio 9.8     Anion Gap 13.0 mmol/L      eGFR 95.2 mL/min/1.73     Narrative:      GFR Normal >60  Chronic Kidney Disease <60  Kidney Failure <15      Lipase [[id number]]  (Normal) Collected: 11/03/24 1738    Specimen: Blood Updated: 11/03/24 1813     Lipase 31 U/L     Amylase [[id number]]  (Normal) Collected: 11/03/24 1738    Specimen: Blood Updated: 11/03/24 1813     Amylase 64 U/L     Blood Culture - Blood, Arm, Right [[id number]] Collected: 11/03/24 1806    Specimen: Blood from Arm, Right Updated: 11/03/24 1812    CBC & Differential [[id number]]  (Abnormal) Collected: 11/03/24 1738    Specimen: Blood Updated: 11/03/24 1747    Narrative:      The following orders were created for panel order CBC & Differential.  Procedure                               Abnormality         Status                     ---------                               -----------         ------                     CBC Auto Differential[[id number]]        Abnormal            Final result                 Please view results for these tests on the individual orders.    CBC Auto Differential [[id number]]  (Abnormal) Collected: 11/03/24 1738    Specimen: Blood Updated: 11/03/24 1747     WBC 12.98 10*3/mm3      RBC 5.41 10*6/mm3      Hemoglobin 16.3 g/dL      Hematocrit 47.2 %      MCV 87.2 fL      MCH 30.1 pg      MCHC 34.5 g/dL      RDW 12.9 %      RDW-SD 41.1 fl      MPV 10.9 fL       Platelets 246 10*3/mm3      Neutrophil % 77.3 %      Lymphocyte % 12.6 %      Monocyte % 8.3 %      Eosinophil % 0.8 %      Basophil % 0.5 %      Immature Grans % 0.5 %      Neutrophils, Absolute 10.02 10*3/mm3      Lymphocytes, Absolute 1.64 10*3/mm3      Monocytes, Absolute 1.08 10*3/mm3      Eosinophils, Absolute 0.10 10*3/mm3      Basophils, Absolute 0.07 10*3/mm3      Immature Grans, Absolute 0.07 10*3/mm3      nRBC 0.0 /100 WBC     Lohn Draw [[id number]] Collected: 11/03/24 1738    Specimen: Blood Updated: 11/03/24 1746    Narrative:      The following orders were created for panel order Lohn Draw.  Procedure                               Abnormality         Status                     ---------                               -----------         ------                     Green Top (Gel)[[id number]]                                  Final result               Lavender Top[[id number]]                                     Final result               Gold Top - SST[[id number]]                                   Final result               Light Blue Top[[id number]]                                   Final result                 Please view results for these tests on the individual orders.    Green Top (Gel) [[id number]] Collected: 11/03/24 1738    Specimen: Blood Updated: 11/03/24 1746     Extra Tube Hold for add-ons.     Comment: Auto resulted.       Lavender Top [[id number]] Collected: 11/03/24 1738    Specimen: Blood Updated: 11/03/24 1746     Extra Tube hold for add-on     Comment: Auto resulted       Gold Top - SST [[id number]] Collected: 11/03/24 1738    Specimen: Blood Updated: 11/03/24 1746     Extra Tube Hold for add-ons.     Comment: Auto resulted.       Light Blue Top [[id number]] Collected: 11/03/24 1738    Specimen: Blood Updated: 11/03/24 1746     Extra Tube Hold for add-ons.     Comment: Auto resulted       Blood Culture - Blood, Arm, Left [[id number]] Collected: 11/03/24 1738    Specimen: Blood from Arm, Left  Updated: 11/03/24 1744    POC Lactate [[id number]]  (Normal) Collected: 11/03/24 1741    Specimen: Blood Updated: 11/03/24 1743     Lactate 0.8 mmol/L      Comment: Serial Number: [id number]:  [id number]               Imaging Results (Most Recent)       Procedure Component Value Units Date/Time    CT Abdomen Pelvis With Contrast [[id number]] Collected: 11/03/24 1921     Updated: 11/03/24 1940    Narrative:      CT ABDOMEN PELVIS W CONTRAST    Date of Exam: 11/3/2024 7:09 PM EST    Indication: generalized abd pain-worse in LLQ, nausea without vomiting, diarrhea.    Comparison: CT abdomen pelvis dated 5/3/2024    Technique: Axial CT images were obtained of the abdomen and pelvis following the uneventful intravenous administration of iodinated contrast. Sagittal and coronal reconstructions were performed.  Automated exposure control and iterative reconstruction   methods were used.      Findings:  Lung Bases:    The visualized lung bases and lower mediastinal structures are unremarkable.      Liver:Liver is normal in size and CT density. No focal lesions.      Biliary/Gallbladder: The gallbladder is without evidence of radiopaque stones. The biliary tree is nondilated.      Spleen:Spleen is normal in size and CT density.    Pancreas:   Pancreas shows homogeneous density. There is no evidence of pancreatic mass or peripancreatic fluid.      Kidneys: Kidneys are normal in size. There are no stones or hydronephrosis. Simple cyst upper pole right kidney      Adrenals: Adrenal glands are unremarkable.      Retroperitoneal/Lymph Nodes/Vasculature: No retroperitoneal adenopathy is identified by size criteria.      Gastrointestinal/Mesentery: The bowel loops are non-dilated. Multiple diverticula in the sigmoid colon with a short segment of wall thickening and pericolonic fat stranding compatible with acute diverticulitis. No associated free fluid or free air. The   appendix appears within normal limits. No evidence of  obstruction. No free air.      Bladder: The bladder is unremarkable    There is no free fluid.      Bony Structures:  Visualized bony structures are consistent with the patient's age.        Impression:      Impression:    Findings compatible with uncomplicated sigmoid diverticulitis. Follow-up to complete resolution recommended            Electronically Signed: Cam Dias MD    11/3/2024 7:38 PM EST    Workstation ID: [id number]          reviewed    ECG/EMG Results (most recent)       Procedure Component Value Units Date/Time    Telemetry Scan [[id number]] Resulted: 11/03/24     Updated: 11/04/24 0813    Telemetry Scan [[id number]] Resulted: 11/03/24     Updated: 11/04/24 1040          reviewed            Microbiology Results (last 10 days)       Procedure Component Value - Date/Time    Clostridioides difficile Toxin - Stool, Per Rectum [[id number]]  (Normal) Collected: 11/04/24 0748    Lab Status: Final result Specimen: Stool from Per Rectum Updated: 11/04/24 0840    Narrative:      The following orders were created for panel order Clostridioides difficile Toxin - Stool, Per Rectum.  Procedure                               Abnormality         Status                     ---------                               -----------         ------                     Clostridioides difficile...[[id number]]  Normal              Final result                 Please view results for these tests on the individual orders.    Gastrointestinal Panel, PCR - Stool, Per Rectum [[id number]]  (Abnormal) Collected: 11/04/24 0748    Lab Status: Final result Specimen: Stool from Per Rectum Updated: 11/04/24 0941     Campylobacter Detected     Plesiomonas shigelloides Not Detected     Salmonella Not Detected     Vibrio Not Detected     Vibrio cholerae Not Detected     Yersinia enterocolitica Not Detected     Enteroaggregative E. coli (EAEC) Not Detected     Enteropathogenic E. coli (EPEC) Not Detected     Enterotoxigenic E. coli (ETEC) lt/st Not  Detected     Shiga-like toxin-producing E. coli (STEC) stx1/stx2 Not Detected     Shigella/Enteroinvasive E. coli (EIEC) Not Detected     Cryptosporidium Not Detected     Cyclospora cayetanensis Not Detected     Entamoeba histolytica Not Detected     Giardia lamblia Not Detected     Adenovirus F40/41 Not Detected     Astrovirus Not Detected     Norovirus GI/GII Not Detected     Rotavirus A Not Detected     Sapovirus (I, II, IV or V) Not Detected    Clostridioides difficile EIA - Stool, Per Rectum [[id number]]  (Normal) Collected: 11/04/24 0748    Lab Status: Final result Specimen: Stool from Per Rectum Updated: 11/04/24 0840     C Diff GDH Ag Negative     C.diff Toxin Ag Negative    Narrative:      The result indicates the absence of toxigenic C.difficile from stool specimen.    Rapid Strep A Screen - Swab, Throat [[id number]]  (Normal) Collected: 11/03/24 1806    Lab Status: Final result Specimen: Swab from Throat Updated: 11/03/24 1826     Strep A Ag Negative    Respiratory Panel PCR w/COVID-19(SARS-CoV-2) ANNEL/ATIF/JUAN/PAD/COR/GISSELLE In-House, NP Swab in UTM/VTM, 2 HR TAT - Swab, Nasopharynx [[id number]]  (Normal) Collected: 11/03/24 1745    Lab Status: Final result Specimen: Swab from Nasopharynx Updated: 11/03/24 1840     ADENOVIRUS, PCR Not Detected     Coronavirus 229E Not Detected     Coronavirus HKU1 Not Detected     Coronavirus NL63 Not Detected     Coronavirus OC43 Not Detected     COVID19 Not Detected     Human Metapneumovirus Not Detected     Human Rhinovirus/Enterovirus Not Detected     Influenza A PCR Not Detected     Influenza B PCR Not Detected     Parainfluenza Virus 1 Not Detected     Parainfluenza Virus 2 Not Detected     Parainfluenza Virus 3 Not Detected     Parainfluenza Virus 4 Not Detected     RSV, PCR Not Detected     Bordetella pertussis pcr Not Detected     Bordetella parapertussis PCR Not Detected     Chlamydophila pneumoniae PCR Not Detected     Mycoplasma pneumo by PCR Not Detected     Narrative:      In the setting of a positive respiratory panel with a viral infection PLUS a negative procalcitonin without other underlying concern for bacterial infection, consider observing off antibiotics or discontinuation of antibiotics and continue supportive care. If the respiratory panel is positive for atypical bacterial infection (Bordetella pertussis, Chlamydophila pneumoniae, or Mycoplasma pneumoniae), consider antibiotic de-escalation to target atypical bacterial infection.            Assessment & Plan     Diverticulitis       Diverticulitis  Lab Results   Component Value Date    WBC 11.15 (H) 11/04/2024    AST 21 11/03/2024    ALT 10 11/03/2024    ALKPHOS 89 11/03/2024    BILITOT 1.2 11/03/2024    LIPASE 31 11/03/2024   -CMP showed mild hypokalemia and patient received potassium per protocol  -WBC 12.98 on admission 11.15 at discharge  -UA negative  -Cdiff negative  -GI panel positive for Campylobacter  -Azithromycin 1 g p.o. x 1  -CT of abdomen and pelvis: Showed uncomplicated sigmoid diverticulitis  -In the ED patient given IV Dilaudid without much relief  -Toradol in the office without much relief  -GI was consulted and recommended discharge later today versus tomorrow  -Patient tolerating low residue diet well and eager for discharge today  -Amox-clav x 10 days at discharge  -Blood cultures pending results    Hypertension  -Well controlled   BP Readings from Last 1 Encounters:   11/04/24 118/72   - Continue losartan and HCTZ  - Monitor while admitted     GERD  -Continue PPI    I discussed the patients findings and my recommendations with patient and nursing staff.     Discharge Diagnosis:      Diverticulitis      Hospital Course  Patient is a 59 y.o. male presented with abdominal pain as noted in HPI above.  CMP and CBC generally unremarkable.  UA and C. difficile negative.  GI panel positive for Campylobacter.  Patient was admitted to the observation unit for GI consultation.  He was given  azithromycin x 1 and started on amox-clav.  He tolerated diet well. At this time, patient felt to be in good condition for discharge with close follow up with PCP and GI. Instructed to take all medications as prescribed and to return to ED if any concerning signs/symptoms. All test/lab results were discussed with patient. All questions were answered and patient verbalizes understanding.    .      Past Medical History:     Past Medical History:   Diagnosis Date    Hypertension        Past Surgical History:     Past Surgical History:   Procedure Laterality Date    APPENDECTOMY         Social History:   Social History     Socioeconomic History    Marital status: Single   Tobacco Use    Smoking status: Never    Smokeless tobacco: Never   Vaping Use    Vaping status: Never Used   Substance and Sexual Activity    Alcohol use: Yes     Comment: Socially    Drug use: Never    Sexual activity: Defer       Procedures Performed         Consults:   Consults       Date and Time Order Name Status Description    11/3/2024 11:05 PM IP Consult to Gastroenterology Completed             Condition on Discharge:     Stable    Discharge Disposition  Home or Self Care    Discharge Medications     Discharge Medications        New Medications        Instructions Start Date   ondansetron ODT 4 MG disintegrating tablet  Commonly known as: ZOFRAN-ODT   4 mg, Translingual, Every 8 Hours PRN             Changes to Medications        Instructions Start Date   amoxicillin-clavulanate 875-125 MG per tablet  Commonly known as: AUGMENTIN  What changed: when to take this   1 tablet, Oral, Every 8 Hours             Continue These Medications        Instructions Start Date   esomeprazole 40 MG capsule  Commonly known as: nexIUM   40 mg, Daily      irbesartan-hydrochlorothiazide 150-12.5 MG tablet  Commonly known as: AVALIDE   1 tablet, Daily               Discharge Diet:   Diet Instructions       Diet: Gastrointestinal Diets; Fiber-Restricted; Soft to  Chew (NDD 3); Whole Meat; Thin (IDDSI 0)      Discharge Diet: Gastrointestinal Diets    Gastrointestinal Diet: Fiber-Restricted    Texture: Soft to Chew (NDD 3)    Soft to Chew: Whole Meat    Fluid Consistency: Thin (IDDSI 0)            Activity at Discharge:     Follow-up Appointments  No future appointments.  Additional Instructions for the Follow-ups that You Need to Schedule       Discharge Follow-up with PCP   As directed       Currently Documented PCP:    Mervat Pike APRN    PCP Phone Number:    [phone]     Follow Up Details: 5-7 days        Discharge Follow-up with Specified Provider: GI   As directed      To: GI   Follow Up Details: scheduled egd appt                Test Results Pending at Discharge  Pending Labs       Order Current Status    Blood Culture - Blood, Arm, Left In process    Blood Culture - Blood, Arm, Right In process             Risk for Readmission (LACE) Score: 1 (11/4/2024  6:00 AM)      Greater than 30 minutes spent in discharge activities for this patient    Signature:Electronically signed by KELLY Rivero, 11/04/24, 1:53 PM EST.

## 2024-11-04 NOTE — ED NOTES
Received a message from pharmacy stating that patient called them about 45 min ago regarding test results. All test results were discussed with patient prior to discharge including gi panel and he verbalized understanding. However, provider called patient and the number listed on 11/04/24 at 1530, no answer. Left a voicemail.

## 2024-11-04 NOTE — PAYOR COMM NOTE
"Danny Peña (59 y.o. Male)       Date of Birth   1965    Social Security Number       Address   [address] [place] [facility]    Home Phone   [phone]    MRN   [id number]       Cheondoism   None    Marital Status   Single                            Admission Date   11/3/24    Admission Type   Emergency    Admitting Provider   Isrrael Coleman MD    Attending Provider   Isrrael Coleman MD    Department, Room/Bed   [facility] OBSERVATION, 223/1       Discharge Date       Discharge Disposition       Discharge Destination                                 Attending Provider: Isrrael Coleman MD    Allergies: Clindamycin/lincomycin    Isolation: None   Infection: Campylobacter (11/04/24)   Code Status: CPR    Ht: 172.7 cm (67.99\")   Wt: 100 kg (220 lb 7.4 oz)    Admission Cmt: None   Principal Problem: Diverticulitis [K57.92]                   Active Insurance as of 11/3/2024       Primary Coverage       Payor Plan Insurance Group Employer/Plan Group    [facility] [id number]       Payor Plan Address Payor Plan Phone Number Payor Plan Fax Number Effective Dates    [address]   1/1/2021 - None Entered    [facility] [id number]         Subscriber Name Subscriber Birth Date Member ID       DANNY PEÑA 1965 [id number]                     Emergency Contacts        (Rel.) Home Phone Work Phone Mobile Phone    ELENALUIS MANUEL COPELAND (Significant Other) -- -- [phone]              History & Physical    No notes of this type exist for this encounter.          Emergency Department Notes        Glenda Caruso APRN at 11/03/24 1658          Subjective   History of Present Illness  59-year-old male with history of hiatal hernia and gastric ulcer presents the ED with complaints of generalized abdominal pain worsening in the left lower quadrant along with nausea without vomiting and diarrhea that has been ongoing for approximately a week and worse " over the last 2 days.  Reports approximately 6-8 episodes of diarrhea daily.  States he developed a fever today.  Also reports a mildly sore throat however this has been ongoing and not new.  Patient states he has a small macular rash to the cheeks and top of the head whenever his potassium is low and this is started develop over the last few days.  Denies chest pain, shortness of breath, congestion, cough, recent travel or sick contacts, dizziness, vision changes, melena, hematochezia, UTI symptoms, difficulty swallowing or difficulty handling secretions.  Reports he had an EGD completed on 4/29/2024 at [facility] and this showed a nonbleeding gastric ulcer and hiatal hernia.  He does not have a follow-up until 12/26/2024 with GI    PCP: currently trying to find a new PCP  GI: [facility]        Review of Systems   Constitutional:  Positive for fever.   HENT:  Negative for congestion.    Respiratory:  Negative for cough and shortness of breath.    Cardiovascular:  Negative for chest pain.   Gastrointestinal:  Positive for abdominal pain, diarrhea and nausea. Negative for blood in stool and vomiting.   Genitourinary:  Negative for dysuria and frequency.       Past Medical History:   Diagnosis Date    Hypertension        Allergies   Allergen Reactions    Clindamycin/Lincomycin GI Intolerance       Past Surgical History:   Procedure Laterality Date    APPENDECTOMY         History reviewed. No pertinent family history.    Social History     Socioeconomic History    Marital status: Single   Tobacco Use    Smoking status: Never    Smokeless tobacco: Never   Vaping Use    Vaping status: Never Used   Substance and Sexual Activity    Alcohol use: Yes     Comment: Socially    Drug use: Never    Sexual activity: Defer           Objective   Physical Exam  Vitals reviewed.   HENT:      Head: Normocephalic.      Comments: Very mild macular rash noted to the cheeks and top of head.  Patient denies itching or burning sensation  Eyes:       "Extraocular Movements: Extraocular movements intact.      Conjunctiva/sclera: Conjunctivae normal.   Cardiovascular:      Rate and Rhythm: Normal rate and regular rhythm.      Pulses: Normal pulses.      Heart sounds: Normal heart sounds.   Pulmonary:      Effort: Pulmonary effort is normal.      Breath sounds: Normal breath sounds.   Abdominal:      General: Bowel sounds are normal.      Palpations: Abdomen is soft.      Tenderness: There is abdominal tenderness.      Comments: Generalized tenderness to the abdomen on palpation.  Worsening to the left lower quadrant.  No peritonitis rigidity or guarding   Musculoskeletal:         General: Normal range of motion.   Skin:     General: Skin is warm and dry.      Findings: Rash present.   Neurological:      General: No focal deficit present.      Mental Status: He is alert and oriented to person, place, and time.   Psychiatric:         Mood and Affect: Mood normal.         Behavior: Behavior normal.         Procedures          ED Course  ED Course as of 11/03/24 2313   Sun Nov 03, 2024   1731 Inspect reviewed of patient, patient had clonazepam 1mg tabs filled 1/22/24 at a quant of 90 for 30 days. Patient states he has not taken this in several weeks but is requesting something for anxiety prior to CT. 0.5 mg ativan ordered to be given prior to CT [KB]   1745 Requested rectal temp [KB]   1831 Temp: 100 °F (37.8 °C) [KB]   1849 Marked ready for CT [KB]      ED Course User Index  [KB] Glenda Caruso APRN      /84 (BP Location: Left arm, Patient Position: Lying)   Pulse 80   Temp 98.6 °F (37 °C) (Oral)   Resp 18   Ht 172.7 cm (67.99\")   Wt 100 kg (220 lb 7.4 oz)   SpO2 97%   BMI 33.53 kg/m²   Labs Reviewed   COMPREHENSIVE METABOLIC PANEL - Abnormal; Notable for the following components:       Result Value    Potassium 3.1 (*)     All other components within normal limits    Narrative:     GFR Normal >60  Chronic Kidney Disease <60  Kidney Failure <15     CBC " "WITH AUTO DIFFERENTIAL - Abnormal; Notable for the following components:    WBC 12.98 (*)     Neutrophil % 77.3 (*)     Lymphocyte % 12.6 (*)     Neutrophils, Absolute 10.02 (*)     Monocytes, Absolute 1.08 (*)     Immature Grans, Absolute 0.07 (*)     All other components within normal limits   RESPIRATORY PANEL PCR W/ COVID-19 (SARS-COV-2), NP SWAB IN UTM/VTP, 2 HR TAT - Normal    Narrative:     In the setting of a positive respiratory panel with a viral infection PLUS a negative procalcitonin without other underlying concern for bacterial infection, consider observing off antibiotics or discontinuation of antibiotics and continue supportive care. If the respiratory panel is positive for atypical bacterial infection (Bordetella pertussis, Chlamydophila pneumoniae, or Mycoplasma pneumoniae), consider antibiotic de-escalation to target atypical bacterial infection.   RAPID STREP A SCREEN - Normal   LIPASE - Normal   AMYLASE - Normal   PROCALCITONIN - Normal    Narrative:     As a Marker for Sepsis (Non-Neonates):    1. <0.5 ng/mL represents a low risk of severe sepsis and/or septic shock.  2. >2 ng/mL represents a high risk of severe sepsis and/or septic shock.    As a Marker for Lower Respiratory Tract Infections that require antibiotic therapy:    PCT on Admission    Antibiotic Therapy       6-12 Hrs later    >0.5                Strongly Recommended  >0.25 - <0.5        Recommended   0.1 - 0.25          Discouraged              Remeasure/reassess PCT  <0.1                Strongly Discouraged     Remeasure/reassess PCT    As 28 day mortality risk marker: \"Change in Procalcitonin Result\" (>80% or <=80%) if Day 0 (or Day 1) and Day 4 values are available. Refer to http://www.[facility]-pct-calculator.com    Change in PCT <=80%  A decrease of PCT levels below or equal to 80% defines a positive change in PCT test result representing a higher risk for 28-day all-cause mortality of patients diagnosed with severe sepsis for " septic shock.    Change in PCT >80%  A decrease of PCT levels of more than 80% defines a negative change in PCT result representing a lower risk for 28-day all-cause mortality of patients diagnosed with severe sepsis or septic shock.      URINALYSIS W/ CULTURE IF INDICATED - Normal    Narrative:     In absence of clinical symptoms, the presence of pyuria, bacteria, and/or nitrites on the urinalysis result does not correlate with infection.  Urine microscopic not indicated.   POC LACTATE - Normal   BLOOD CULTURE   BLOOD CULTURE   CLOSTRIDIOIDES DIFFICILE TOXIN    Narrative:     The following orders were created for panel order Clostridioides difficile Toxin - Stool, Per Rectum.  Procedure                               Abnormality         Status                     ---------                               -----------         ------                     Clostridioides difficile...[[id number]]                                                   Please view results for these tests on the individual orders.   GASTROINTESTINAL PANEL, PCR (PREFERRED) DOES NOT INCLUDE CDIFF   CLOSTRIDIOIDES DIFFICILE EIA   RAINBOW DRAW    Narrative:     The following orders were created for panel order [place] Draw.  Procedure                               Abnormality         Status                     ---------                               -----------         ------                     Green Top (Gel)[[id number]]                                  Final result               Lavender Top[[id number]]                                     Final result               Gold Top - SST[[id number]]                                   Final result               Light Blue Top[[id number]]                                   Final result                 Please view results for these tests on the individual orders.   BASIC METABOLIC PANEL   CBC WITH AUTO DIFFERENTIAL   CBC AND DIFFERENTIAL    Narrative:     The following orders were created for panel order CBC &  Differential.  Procedure                               Abnormality         Status                     ---------                               -----------         ------                     CBC Auto Differential[[id number]]        Abnormal            Final result                 Please view results for these tests on the individual orders.   GREEN TOP   LAVENDER TOP   GOLD TOP - SST   LIGHT BLUE TOP     Medications   sodium chloride 0.9 % flush 10 mL (has no administration in time range)   potassium chloride (KLOR-CON M20) CR tablet 40 mEq (has no administration in time range)   sodium chloride 0.9 % flush 10 mL (10 mL Intravenous Given 11/3/24 2307)   sodium chloride 0.9 % flush 10 mL (has no administration in time range)   sodium chloride 0.9 % infusion 40 mL (has no administration in time range)   ondansetron (ZOFRAN) injection 4 mg (has no administration in time range)   melatonin tablet 5 mg (has no administration in time range)   sennosides-docusate (PERICOLACE) 8.6-50 MG per tablet 2 tablet (has no administration in time range)     And   polyethylene glycol (MIRALAX) packet 17 g (has no administration in time range)     And   bisacodyl (DULCOLAX) EC tablet 5 mg (has no administration in time range)     And   bisacodyl (DULCOLAX) suppository 10 mg (has no administration in time range)   HYDROmorphone (DILAUDID) injection 0.5 mg (has no administration in time range)   sodium chloride 0.9 % bolus 500 mL (has no administration in time range)   ondansetron (ZOFRAN) injection 4 mg (4 mg Intravenous Given 11/3/24 1744)   HYDROmorphone (DILAUDID) injection 0.5 mg (0.5 mg Intravenous Given 11/3/24 1744)   acetaminophen (TYLENOL) suppository 650 mg (650 mg Rectal Given 11/3/24 1745)   LORazepam (ATIVAN) injection 0.5 mg (0.5 mg Intravenous Given 11/3/24 1900)   prochlorperazine (COMPAZINE) injection 5 mg (5 mg Intravenous Given 11/3/24 1802)   iopamidol (ISOVUE-370) 76 % injection 100 mL (100 mL Intravenous Given 11/3/24  1911)   amoxicillin-clavulanate (AUGMENTIN) 875-125 MG per tablet 1 tablet (1 tablet Oral Given 11/3/24 2220)   potassium chloride (KLOR-CON M20) CR tablet 40 mEq (40 mEq Oral Given 11/3/24 2220)   HYDROmorphone (DILAUDID) injection 0.5 mg (0.5 mg Intravenous Given 11/3/24 2220)   CT Abdomen Pelvis With Contrast    Result Date: 11/3/2024  Impression: Findings compatible with uncomplicated sigmoid diverticulitis. Follow-up to complete resolution recommended Electronically Signed: Cam Dias MD  11/3/2024 7:38 PM EST  Workstation ID: [id number]                                             Medical Decision Making  Patient seen for above complaints.  IV was established and blood work was obtained to assess for electrolyte normalities, infection, lipase.  Procalcitonin 0.08, amylase 64, lipase 31, white blood cell count mildly elevated at 12.98, hemoglobin 16.3, electrolytes fairly within normal limits besides potassium 3.1, lactic 0.8, blood cultures currently pending at this time.  Strep negative, respiratory panel negative.  UA obtained, this was negative.  Attempted to obtain C. difficile and GI PCR panel however was not able to obtain during ER course.  CT of the abdomen and pelvis was obtained and independently interpreted by the radiologist as findings compatible with uncomplicated sigmoid diverticulitis, follow-up to complete resolution recommended.  Due to the diverticulitis, patient was given Augmentin during ER course.  Was also given Dilaudid Zofran Compazine and Ativan.  Was given Ativan due to anxiety during CT.  On follow-up, patient states he feels moderately better at this time.  Was given 40mEq hypokalemia.  Patient replaced observation unit for further pain management and follow-up in the morning along with GI consult.  Discussed plan of care with patient and girlfriend at bedside who verbalized understanding were agreeable plan of care at this time.    Problems Addressed:  Diarrhea, unspecified  type: acute illness or injury  Diverticulitis: acute illness or injury  Generalized abdominal pain: acute illness or injury  Nausea without vomiting: acute illness or injury  Sepsis, due to unspecified organism, unspecified whether acute organ dysfunction present: acute illness or injury    Amount and/or Complexity of Data Reviewed  Labs: ordered. Decision-making details documented in ED Course.  Radiology: ordered and independent interpretation performed. Decision-making details documented in ED Course.  ECG/medicine tests: ordered.    Risk  OTC drugs.  Prescription drug management.  Decision regarding hospitalization.        Final diagnoses:   Generalized abdominal pain   Nausea without vomiting   Diarrhea, unspecified type   Diverticulitis   Sepsis, due to unspecified organism, unspecified whether acute organ dysfunction present       ED Disposition  ED Disposition       ED Disposition   Decision to Admit    Condition   --    Comment   --               No follow-up provider specified.       Medication List      No changes were made to your prescriptions during this visit.            Glenda Caruso APRN  11/03/24 2313      Electronically signed by Glenda Caruso APRN at 11/03/24 2313       Vital Signs (last 2 days)       Date/Time Temp Temp src Pulse Resp BP Patient Position SpO2    11/04/24 1106 98.3 (36.8) Oral -- 20 118/72 Lying 97    11/04/24 0741 98.1 (36.7) Oral 88 22 120/77 Lying 97    11/04/24 0435 -- -- 81 -- -- -- 96    11/04/24 0433 -- -- 84 -- -- -- 95    11/04/24 0402 98.5 (36.9) Oral 91 17 141/83 Lying 96    11/04/24 0046 98.6 (37) Oral -- 17 -- Lying --    11/03/24 2241 -- -- 80 -- -- -- 97    11/03/24 2238 -- -- 82 -- -- -- 83    11/03/24 2208 98.6 (37) Oral 88 18 126/84 Lying 96    11/03/24 2044 -- -- 85 -- 118/77 -- 97    11/03/24 20:30:01 98.2 (36.8) Oral -- -- -- -- --    11/03/24 2018 -- -- 87 -- 100/70 -- 96    11/03/24 2003 -- -- 89 -- 108/66 -- 96    11/03/24 1948 -- -- 94 -- 109/68 -- 96     11/03/24 1933 -- -- 90 -- 127/78 -- 97    11/03/24 1901 -- -- 86 -- 144/75 -- 99    11/03/24 1846 -- -- 84 -- 123/71 -- 93    11/03/24 1831 -- -- 87 -- 134/75 -- 93    11/03/24 1816 -- -- 89 -- 125/78 -- 94    11/03/24 1801 100 (37.8) Rectal 85 -- 140/89 -- 98    11/03/24 1746 -- -- 89 -- 135/85 -- 97    11/03/24 1731 -- -- 91 -- 135/86 -- 97    11/03/24 1716 -- -- 94 -- 146/83 -- 98    11/03/24 1701 -- -- 89 -- 135/85 -- 98    11/03/24 1656 -- -- 92 -- 138/79 -- 97    11/03/24 1644 101.4 (38.6) Oral 101 18 121/88 Sitting 97          Operative/Procedure Notes (all)    No notes of this type exist for this encounter.       Physician Progress Notes (all)    No notes of this type exist for this encounter.          Consult Notes (all)        Tiffanie Blackmon APRN at 11/04/24 0935        Consult Orders    1. IP Consult to Gastroenterology [[id number]] ordered by Glenda Caruso APRN at 11/03/24 2305              Attestation signed by Markus Randle MD at 11/04/24 0919    The patient was seen and examined with gastroenterology advanced practice provider, JEN Mobley. I personally performed the substantive portion of the history of presenting illness.  I also performed the physical exam and the medical decision making.    59-year-old male presenting with lower abdominal pain and diarrhea.  He has a history of recurrent diverticulitis.  He reports having a colonoscopy in 2020 with Dr. Dugan.  No fevers, rectal bleeding, or vomiting.  He has chronic nausea and has a history of peptic ulcer disease with erosive esophagitis.  [facility] this [facility].  WBC 11, Hgb 15.  On exam he is obese but in no acute distress.  His abdomen is soft with mild lower abdominal tenderness without rebound or guarding.  Stool PCR positive for Campylobacter  Impression:  Abnormal CT of the sigmoid colon possibly secondary to active diverticulitis versus colitis versus chronic bowel wall thickening from  history of diverticulitis  Campylobacter infection likely causing his acute symptoms  History of peptic ulcer disease  Plan:  Azithromycin 1 g p.o. x 1 for Campylobacter infection  PPI daily for history of PPI  GI soft diet  Patient may be discharged home later today or in the morning depending on his clinical course and follow-up with GI in the office    Electronically signed by Markus Randle MD, 11/04/24, 9:51 AM EST.                       GI CONSULT  NOTE:    Referring Provider:  Glenda Caruso NP     Chief complaint: Abdominal pain    Subjective .     History of present illness: Amandeep James is a 59 y.o. male with history of anxiety, GERD, hypertension, recurrent diverticulitis, and gastric ulcer on EGD in 4/2024 who presents with complaints of abdominal pain.  The patient reports that he has been having some intermittent lower abdominal pain over the past week.  However, pain significantly worsened 3 days ago which prompted his presentation to the ER.  He states that he has been having loose stools over the past several days as well.  Denies any bright red blood per rectum or melena.  Complains of associated nausea, but denies any vomiting.  No heartburn or dysphagia.  Denies any NSAID use.  Reports at least 3 episodes of acute diverticulitis.      Endo History:  4/2024 EGD (Dr. Robert Cho) -LA grade B erosive esophagitis, hiatal hernia, gastric ulcer  6/2022 colonoscopy (Dr. Dugan) -TA polyp    Past Medical History:  Past Medical History:   Diagnosis Date    Hypertension        Past Surgical History:  Past Surgical History:   Procedure Laterality Date    APPENDECTOMY         Social History:  Social History     Tobacco Use    Smoking status: Never    Smokeless tobacco: Never   Vaping Use    Vaping status: Never Used   Substance Use Topics    Alcohol use: Yes     Comment: Socially    Drug use: Never       Family History:  History reviewed. No pertinent family  history.    Medications:  Medications Prior to Admission   Medication Sig Dispense Refill Last Dose/Taking    amoxicillin-clavulanate (AUGMENTIN) 875-125 MG per tablet Take 1 tablet by mouth Every 12 (Twelve) Hours.   Taking    esomeprazole (nexIUM) 40 MG capsule Take 1 capsule by mouth Daily.   11/3/2024 at  7:00 AM    irbesartan-hydrochlorothiazide (AVALIDE) 150-12.5 MG tablet Take 1 tablet by mouth Daily.   11/3/2024 at  8:00 AM       Scheduled Meds:amoxicillin-clavulanate, 1 tablet, Oral, Q12H  losartan, 50 mg, Oral, Q24H   And  hydroCHLOROthiazide, 12.5 mg, Oral, Q24H  pantoprazole, 40 mg, Oral, Q AM  sodium chloride, 10 mL, Intravenous, Q12H      Continuous Infusions:   PRN Meds:.  senna-docusate sodium **AND** polyethylene glycol **AND** bisacodyl **AND** bisacodyl    melatonin    ondansetron    sodium chloride    sodium chloride    sodium chloride    ALLERGIES:  Clindamycin/lincomycin    ROS:  The following systems were reviewed and negative;   Constitution:  No fevers, chills, no unintentional weight loss  Skin: no rash, no jaundice  Eyes:  No blurry vision, no eye pain  HENT:  No change in hearing or smell  Resp:  No dyspnea or cough  CV:  No chest pain or palpitations  :  No dysuria, hematuria  Musculoskeletal:  No leg cramps or arthralgias  Neuro:  No tremor, no numbness  Psych:  No depression or confusion    Objective     Vital Signs:   Vitals:    11/04/24 0402 11/04/24 0433 11/04/24 0435 11/04/24 0741   BP: 141/83   120/77   BP Location: Left arm   Right arm   Patient Position: Lying   Lying   Pulse: 91 84 81 88   Resp: 17   22   Temp: 98.5 °F (36.9 °C)   98.1 °F (36.7 °C)   TempSrc: Oral   Oral   SpO2: 96% 95% 96% 97%   Weight:       Height:           Physical Exam:       General Appearance:    Awake and alert, in no acute distress   Head:    Normocephalic, without obvious abnormality, atraumatic   Throat:   No oral lesions, no thrush, oral mucosa moist   Lungs:     Respirations regular, even and  "unlabored   Chest Wall:    No abnormalities observed   Abdomen:     Soft, lower abdominal tenderness, no rebound or guarding, non-distended   Rectal:     Deferred   Extremities:   Moves all extremities, no edema, no cyanosis   Pulses:   Pulses palpable and equal bilaterally   Skin:   No rash, no jaundice, normal palpation   Lymph nodes:   No cervical, supraclavicular or submandibular palpable adenopathy   Neurologic:   Cranial nerves 2 - 12 grossly intact, no asterixis       Results Review:   I reviewed the patient's labs and imaging.  CBC    Results from last 7 days   Lab Units 11/04/24 0051 11/03/24  1738   WBC 10*3/mm3 11.15* 12.98*   HEMOGLOBIN g/dL 15.8 16.3   PLATELETS 10*3/mm3 222 246     CMP   Results from last 7 days   Lab Units 11/04/24 0051 11/03/24  1738   SODIUM mmol/L 138 139   POTASSIUM mmol/L 3.4* 3.1*   CHLORIDE mmol/L 98 98   CO2 mmol/L 29.4* 28.0   BUN mg/dL 7* 9   CREATININE mg/dL 0.86 0.92   GLUCOSE mg/dL 89 86   ALBUMIN g/dL  --  4.6   BILIRUBIN mg/dL  --  1.2   ALK PHOS U/L  --  89   AST (SGOT) U/L  --  21   ALT (SGPT) U/L  --  10   MAGNESIUM mg/dL 1.6  --    AMYLASE U/L  --  64   LIPASE U/L  --  31     Cr Clearance Estimated Creatinine Clearance: 104.7 mL/min (by C-G formula based on SCr of 0.86 mg/dL).  Coag     HbA1C No results found for: \"HGBA1C\"  Blood Glucose No results found for: \"POCGLU\"  Infection   Results from last 7 days   Lab Units 11/03/24  1738   PROCALCITONIN ng/mL 0.08     UA    Results from last 7 days   Lab Units 11/03/24  1955   NITRITE UA  Negative     Imaging Results (Last 72 Hours)       Procedure Component Value Units Date/Time    CT Abdomen Pelvis With Contrast [[id number]] Collected: 11/03/24 1921     Updated: 11/03/24 1940    Narrative:      CT ABDOMEN PELVIS W CONTRAST    Date of Exam: 11/3/2024 7:09 PM EST    Indication: generalized abd pain-worse in LLQ, nausea without vomiting, diarrhea.    Comparison: CT abdomen pelvis dated 5/3/2024    Technique: Axial CT " images were obtained of the abdomen and pelvis following the uneventful intravenous administration of iodinated contrast. Sagittal and coronal reconstructions were performed.  Automated exposure control and iterative reconstruction   methods were used.      Findings:  Lung Bases:    The visualized lung bases and lower mediastinal structures are unremarkable.      Liver:Liver is normal in size and CT density. No focal lesions.      Biliary/Gallbladder: The gallbladder is without evidence of radiopaque stones. The biliary tree is nondilated.      Spleen:Spleen is normal in size and CT density.    Pancreas:   Pancreas shows homogeneous density. There is no evidence of pancreatic mass or peripancreatic fluid.      Kidneys: Kidneys are normal in size. There are no stones or hydronephrosis. Simple cyst upper pole right kidney      Adrenals: Adrenal glands are unremarkable.      Retroperitoneal/Lymph Nodes/Vasculature: No retroperitoneal adenopathy is identified by size criteria.      Gastrointestinal/Mesentery: The bowel loops are non-dilated. Multiple diverticula in the sigmoid colon with a short segment of wall thickening and pericolonic fat stranding compatible with acute diverticulitis. No associated free fluid or free air. The   appendix appears within normal limits. No evidence of obstruction. No free air.      Bladder: The bladder is unremarkable    There is no free fluid.      Bony Structures:  Visualized bony structures are consistent with the patient's age.        Impression:      Impression:    Findings compatible with uncomplicated sigmoid diverticulitis. Follow-up to complete resolution recommended            Electronically Signed: Cam Dias MD    11/3/2024 7:38 PM EST    Workstation ID: [id number]            ASSESSMENT:  -Acute uncomplicated sigmoid diverticulitis  -Leukocytosis -suspect due to above  -Lower abdominal pain  -Nausea  -Diarrhea  -History of gastric ulcer -diagnosed on EGD in  4/2024  -Hiatal hernia  -Anxiety    PLAN:  Patient is a 59-year-old male with history of gastric ulcer diagnosed on EGD in 4/2024 who presented on 11/3 with complaints of lower abdominal pain.    CT abd/pelvis W on admission shows acute uncomplicated sigmoid diverticulitis.  WBC count 11.15 from 12.98.  Continue antibiotics.  LFTs and lipase unremarkable.  Stool for C. difficile negative this admission.  GI PCR pending.  Patient is scheduled for outpatient EGD on 12/26/2024.  Can proceed with this as scheduled on an outpatient basis.  Antiemetics/analgesics as needed.  We will advance to low residue diet.  Possibly home later today versus tomorrow from GI standpoint.  Supportive care.      I discussed the patients findings and my recommendations with the patient.  I will discuss the case with Dr. Randle and change the plan accordingly.    We appreciate the referral.    Electronically signed by KELLY Dolan, 11/04/24, 9:35 AM EST.                  Electronically signed by Markus Randle MD at 11/04/24 0954

## 2024-11-04 NOTE — CONSULTS
GI CONSULT  NOTE:    Referring Provider:  Glenda Caruso NP     Chief complaint: Abdominal pain    Subjective .     History of present illness: Amandeep James is a 59 y.o. male with history of anxiety, GERD, hypertension, recurrent diverticulitis, and gastric ulcer on EGD in 4/2024 who presents with complaints of abdominal pain.  The patient reports that he has been having some intermittent lower abdominal pain over the past week.  However, pain significantly worsened 3 days ago which prompted his presentation to the ER.  He states that he has been having loose stools over the past several days as well.  Denies any bright red blood per rectum or melena.  Complains of associated nausea, but denies any vomiting.  No heartburn or dysphagia.  Denies any NSAID use.  Reports at least 3 episodes of acute diverticulitis.      Endo History:  4/2024 EGD (Dr. Robert Cho) -LA grade B erosive esophagitis, hiatal hernia, gastric ulcer  6/2022 colonoscopy (Dr. Dugan) -TA polyp    Past Medical History:  Past Medical History:   Diagnosis Date    Hypertension        Past Surgical History:  Past Surgical History:   Procedure Laterality Date    APPENDECTOMY         Social History:  Social History     Tobacco Use    Smoking status: Never    Smokeless tobacco: Never   Vaping Use    Vaping status: Never Used   Substance Use Topics    Alcohol use: Yes     Comment: Socially    Drug use: Never       Family History:  History reviewed. No pertinent family history.    Medications:  Medications Prior to Admission   Medication Sig Dispense Refill Last Dose/Taking    amoxicillin-clavulanate (AUGMENTIN) 875-125 MG per tablet Take 1 tablet by mouth Every 12 (Twelve) Hours.   Taking    esomeprazole (nexIUM) 40 MG capsule Take 1 capsule by mouth Daily.   11/3/2024 at  7:00 AM    irbesartan-hydrochlorothiazide (AVALIDE) 150-12.5 MG tablet Take 1 tablet by mouth Daily.   11/3/2024 at  8:00 AM       Scheduled Meds:amoxicillin-clavulanate, 1 tablet,  Oral, Q12H  losartan, 50 mg, Oral, Q24H   And  hydroCHLOROthiazide, 12.5 mg, Oral, Q24H  pantoprazole, 40 mg, Oral, Q AM  sodium chloride, 10 mL, Intravenous, Q12H      Continuous Infusions:   PRN Meds:.  senna-docusate sodium **AND** polyethylene glycol **AND** bisacodyl **AND** bisacodyl    melatonin    ondansetron    sodium chloride    sodium chloride    sodium chloride    ALLERGIES:  Clindamycin/lincomycin    ROS:  The following systems were reviewed and negative;   Constitution:  No fevers, chills, no unintentional weight loss  Skin: no rash, no jaundice  Eyes:  No blurry vision, no eye pain  HENT:  No change in hearing or smell  Resp:  No dyspnea or cough  CV:  No chest pain or palpitations  :  No dysuria, hematuria  Musculoskeletal:  No leg cramps or arthralgias  Neuro:  No tremor, no numbness  Psych:  No depression or confusion    Objective     Vital Signs:   Vitals:    11/04/24 0402 11/04/24 0433 11/04/24 0435 11/04/24 0741   BP: 141/83   120/77   BP Location: Left arm   Right arm   Patient Position: Lying   Lying   Pulse: 91 84 81 88   Resp: 17   22   Temp: 98.5 °F (36.9 °C)   98.1 °F (36.7 °C)   TempSrc: Oral   Oral   SpO2: 96% 95% 96% 97%   Weight:       Height:           Physical Exam:       General Appearance:    Awake and alert, in no acute distress   Head:    Normocephalic, without obvious abnormality, atraumatic   Throat:   No oral lesions, no thrush, oral mucosa moist   Lungs:     Respirations regular, even and unlabored   Chest Wall:    No abnormalities observed   Abdomen:     Soft, lower abdominal tenderness, no rebound or guarding, non-distended   Rectal:     Deferred   Extremities:   Moves all extremities, no edema, no cyanosis   Pulses:   Pulses palpable and equal bilaterally   Skin:   No rash, no jaundice, normal palpation   Lymph nodes:   No cervical, supraclavicular or submandibular palpable adenopathy   Neurologic:   Cranial nerves 2 - 12 grossly intact, no asterixis       Results  "Review:   I reviewed the patient's labs and imaging.  CBC    Results from last 7 days   Lab Units 11/04/24  0051 11/03/24  1738   WBC 10*3/mm3 11.15* 12.98*   HEMOGLOBIN g/dL 15.8 16.3   PLATELETS 10*3/mm3 222 246     CMP   Results from last 7 days   Lab Units 11/04/24  0051 11/03/24  1738   SODIUM mmol/L 138 139   POTASSIUM mmol/L 3.4* 3.1*   CHLORIDE mmol/L 98 98   CO2 mmol/L 29.4* 28.0   BUN mg/dL 7* 9   CREATININE mg/dL 0.86 0.92   GLUCOSE mg/dL 89 86   ALBUMIN g/dL  --  4.6   BILIRUBIN mg/dL  --  1.2   ALK PHOS U/L  --  89   AST (SGOT) U/L  --  21   ALT (SGPT) U/L  --  10   MAGNESIUM mg/dL 1.6  --    AMYLASE U/L  --  64   LIPASE U/L  --  31     Cr Clearance Estimated Creatinine Clearance: 104.7 mL/min (by C-G formula based on SCr of 0.86 mg/dL).  Coag     HbA1C No results found for: \"HGBA1C\"  Blood Glucose No results found for: \"POCGLU\"  Infection   Results from last 7 days   Lab Units 11/03/24  1738   PROCALCITONIN ng/mL 0.08     UA    Results from last 7 days   Lab Units 11/03/24  1955   NITRITE UA  Negative     Imaging Results (Last 72 Hours)       Procedure Component Value Units Date/Time    CT Abdomen Pelvis With Contrast [[id number]] Collected: 11/03/24 1921     Updated: 11/03/24 1940    Narrative:      CT ABDOMEN PELVIS W CONTRAST    Date of Exam: 11/3/2024 7:09 PM EST    Indication: generalized abd pain-worse in LLQ, nausea without vomiting, diarrhea.    Comparison: CT abdomen pelvis dated 5/3/2024    Technique: Axial CT images were obtained of the abdomen and pelvis following the uneventful intravenous administration of iodinated contrast. Sagittal and coronal reconstructions were performed.  Automated exposure control and iterative reconstruction   methods were used.      Findings:  Lung Bases:    The visualized lung bases and lower mediastinal structures are unremarkable.      Liver:Liver is normal in size and CT density. No focal lesions.      Biliary/Gallbladder: The gallbladder is without evidence " of radiopaque stones. The biliary tree is nondilated.      Spleen:Spleen is normal in size and CT density.    Pancreas:   Pancreas shows homogeneous density. There is no evidence of pancreatic mass or peripancreatic fluid.      Kidneys: Kidneys are normal in size. There are no stones or hydronephrosis. Simple cyst upper pole right kidney      Adrenals: Adrenal glands are unremarkable.      Retroperitoneal/Lymph Nodes/Vasculature: No retroperitoneal adenopathy is identified by size criteria.      Gastrointestinal/Mesentery: The bowel loops are non-dilated. Multiple diverticula in the sigmoid colon with a short segment of wall thickening and pericolonic fat stranding compatible with acute diverticulitis. No associated free fluid or free air. The   appendix appears within normal limits. No evidence of obstruction. No free air.      Bladder: The bladder is unremarkable    There is no free fluid.      Bony Structures:  Visualized bony structures are consistent with the patient's age.        Impression:      Impression:    Findings compatible with uncomplicated sigmoid diverticulitis. Follow-up to complete resolution recommended            Electronically Signed: Cam Dias MD    11/3/2024 7:38 PM EST    Workstation ID: [id number]            ASSESSMENT:  -Acute uncomplicated sigmoid diverticulitis  -Leukocytosis -suspect due to above  -Lower abdominal pain  -Nausea  -Diarrhea  -History of gastric ulcer -diagnosed on EGD in 4/2024  -Hiatal hernia  -Anxiety    PLAN:  Patient is a 59-year-old male with history of gastric ulcer diagnosed on EGD in 4/2024 who presented on 11/3 with complaints of lower abdominal pain.    CT abd/pelvis W on admission shows acute uncomplicated sigmoid diverticulitis.  WBC count 11.15 from 12.98.  Continue antibiotics.  LFTs and lipase unremarkable.  Stool for C. difficile negative this admission.  GI PCR pending.  Patient is scheduled for outpatient EGD on 12/26/2024.  Can proceed with this as  scheduled on an outpatient basis.  Antiemetics/analgesics as needed.  We will advance to low residue diet.  Possibly home later today versus tomorrow from GI standpoint.  Supportive care.      I discussed the patients findings and my recommendations with the patient.  I will discuss the case with Dr. Randle and change the plan accordingly.    We appreciate the referral.    Electronically signed by KELLY Dolan, 11/04/24, 9:35 AM EST.

## 2024-11-08 LAB
BACTERIA SPEC AEROBE CULT: NORMAL
BACTERIA SPEC AEROBE CULT: NORMAL

## 2024-12-26 ENCOUNTER — OFFICE (AMBULATORY)
Dept: URBAN - METROPOLITAN AREA PATHOLOGY 19 | Facility: PATHOLOGY | Age: 59
End: 2024-12-26
Payer: COMMERCIAL

## 2024-12-26 ENCOUNTER — ON CAMPUS - OUTPATIENT (AMBULATORY)
Dept: URBAN - METROPOLITAN AREA HOSPITAL 2 | Facility: HOSPITAL | Age: 59
End: 2024-12-26
Payer: COMMERCIAL

## 2024-12-26 VITALS
TEMPERATURE: 97.7 F | DIASTOLIC BLOOD PRESSURE: 98 MMHG | OXYGEN SATURATION: 98 % | HEART RATE: 88 BPM | SYSTOLIC BLOOD PRESSURE: 137 MMHG | HEART RATE: 68 BPM | SYSTOLIC BLOOD PRESSURE: 114 MMHG | HEART RATE: 85 BPM | HEART RATE: 77 BPM | RESPIRATION RATE: 16 BRPM | OXYGEN SATURATION: 100 % | OXYGEN SATURATION: 99 % | DIASTOLIC BLOOD PRESSURE: 95 MMHG | OXYGEN SATURATION: 19 % | SYSTOLIC BLOOD PRESSURE: 129 MMHG | DIASTOLIC BLOOD PRESSURE: 85 MMHG | DIASTOLIC BLOOD PRESSURE: 91 MMHG | RESPIRATION RATE: 18 BRPM | HEIGHT: 69 IN | SYSTOLIC BLOOD PRESSURE: 124 MMHG | SYSTOLIC BLOOD PRESSURE: 151 MMHG | RESPIRATION RATE: 14 BRPM | HEART RATE: 79 BPM | WEIGHT: 224 LBS | DIASTOLIC BLOOD PRESSURE: 82 MMHG | SYSTOLIC BLOOD PRESSURE: 140 MMHG

## 2024-12-26 DIAGNOSIS — K29.50 UNSPECIFIED CHRONIC GASTRITIS WITHOUT BLEEDING: ICD-10-CM

## 2024-12-26 DIAGNOSIS — K25.9 GASTRIC ULCER, UNSPECIFIED AS ACUTE OR CHRONIC, WITHOUT HEMO: ICD-10-CM

## 2024-12-26 DIAGNOSIS — R49.0 DYSPHONIA: ICD-10-CM

## 2024-12-26 DIAGNOSIS — K44.9 DIAPHRAGMATIC HERNIA WITHOUT OBSTRUCTION OR GANGRENE: ICD-10-CM

## 2024-12-26 DIAGNOSIS — K22.2 ESOPHAGEAL OBSTRUCTION: ICD-10-CM

## 2024-12-26 DIAGNOSIS — R93.3 ABNORMAL FINDINGS ON DIAGNOSTIC IMAGING OF OTHER PARTS OF DI: ICD-10-CM

## 2024-12-26 DIAGNOSIS — K21.9 GASTRO-ESOPHAGEAL REFLUX DISEASE WITHOUT ESOPHAGITIS: ICD-10-CM

## 2024-12-26 LAB
GI HISTOLOGY: A. STOMACH ANTRUM: (no result)
GI HISTOLOGY: PDF REPORT: (no result)

## 2024-12-26 PROCEDURE — 43239 EGD BIOPSY SINGLE/MULTIPLE: CPT | Performed by: INTERNAL MEDICINE

## 2024-12-26 PROCEDURE — 88305 TISSUE EXAM BY PATHOLOGIST: CPT | Mod: 26 | Performed by: PATHOLOGY
